# Patient Record
Sex: MALE | Race: BLACK OR AFRICAN AMERICAN | Employment: UNEMPLOYED | URBAN - METROPOLITAN AREA
[De-identification: names, ages, dates, MRNs, and addresses within clinical notes are randomized per-mention and may not be internally consistent; named-entity substitution may affect disease eponyms.]

---

## 2019-06-22 ENCOUNTER — APPOINTMENT (OUTPATIENT)
Dept: GENERAL RADIOLOGY | Age: 29
End: 2019-06-22

## 2019-06-22 ENCOUNTER — HOSPITAL ENCOUNTER (EMERGENCY)
Age: 29
Discharge: HOME | End: 2019-06-22
Attending: EMERGENCY MEDICINE

## 2019-06-22 VITALS
WEIGHT: 315 LBS | TEMPERATURE: 98.1 F | BODY MASS INDEX: 41.75 KG/M2 | DIASTOLIC BLOOD PRESSURE: 114 MMHG | SYSTOLIC BLOOD PRESSURE: 164 MMHG | OXYGEN SATURATION: 98 % | HEIGHT: 73 IN | HEART RATE: 77 BPM | RESPIRATION RATE: 20 BRPM

## 2019-06-22 DIAGNOSIS — M79.602 LEFT ARM PAIN: Primary | ICD-10-CM

## 2019-06-22 PROCEDURE — 73110 X-RAY EXAM OF WRIST: CPT

## 2019-06-22 PROCEDURE — 90715 TDAP VACCINE 7 YRS/> IM: CPT | Performed by: PHYSICIAN ASSISTANT

## 2019-06-22 PROCEDURE — 90471 IMMUNIZATION ADMIN: CPT | Performed by: PHYSICIAN ASSISTANT

## 2019-06-22 PROCEDURE — 6370000000 HC RX 637 (ALT 250 FOR IP): Performed by: PHYSICIAN ASSISTANT

## 2019-06-22 PROCEDURE — 6360000002 HC RX W HCPCS: Performed by: PHYSICIAN ASSISTANT

## 2019-06-22 PROCEDURE — 73090 X-RAY EXAM OF FOREARM: CPT

## 2019-06-22 PROCEDURE — 99283 EMERGENCY DEPT VISIT LOW MDM: CPT

## 2019-06-22 RX ORDER — OXYCODONE HYDROCHLORIDE AND ACETAMINOPHEN 5; 325 MG/1; MG/1
1 TABLET ORAL ONCE
Status: COMPLETED | OUTPATIENT
Start: 2019-06-22 | End: 2019-06-22

## 2019-06-22 RX ORDER — IBUPROFEN 800 MG/1
800 TABLET ORAL EVERY 8 HOURS PRN
Qty: 30 TABLET | Refills: 0 | Status: ON HOLD | OUTPATIENT
Start: 2019-06-22 | End: 2020-09-01 | Stop reason: HOSPADM

## 2019-06-22 RX ADMIN — OXYCODONE HYDROCHLORIDE AND ACETAMINOPHEN 1 TABLET: 5; 325 TABLET ORAL at 18:38

## 2019-06-22 RX ADMIN — TETANUS TOXOID, REDUCED DIPHTHERIA TOXOID AND ACELLULAR PERTUSSIS VACCINE, ADSORBED 0.5 ML: 5; 2.5; 8; 8; 2.5 SUSPENSION INTRAMUSCULAR at 19:06

## 2019-06-22 SDOH — HEALTH STABILITY: MENTAL HEALTH: HOW OFTEN DO YOU HAVE A DRINK CONTAINING ALCOHOL?: NEVER

## 2019-06-22 ASSESSMENT — ENCOUNTER SYMPTOMS
PHOTOPHOBIA: 0
ABDOMINAL PAIN: 0
SHORTNESS OF BREATH: 0

## 2019-06-22 ASSESSMENT — PAIN DESCRIPTION - LOCATION: LOCATION: ARM

## 2019-06-22 ASSESSMENT — PAIN DESCRIPTION - ORIENTATION: ORIENTATION: LEFT

## 2019-06-22 ASSESSMENT — PAIN SCALES - GENERAL
PAINLEVEL_OUTOF10: 6
PAINLEVEL_OUTOF10: 6

## 2019-06-22 ASSESSMENT — PAIN DESCRIPTION - PAIN TYPE: TYPE: ACUTE PAIN

## 2019-06-22 NOTE — ED TRIAGE NOTES
Pt states that he was cutting limbs off an oak tree, and was half way through cutting a limb when it broke and fell onto his left arm.

## 2019-06-22 NOTE — ED PROVIDER NOTES
[address]  [facility] ENCOUNTER          PHYSICIAN ASSISTANT NOTE       Date of evaluation: 6/22/2019    Chief Complaint     Arm Injury (cutting tree when branch fell on arm,\"large oak branch\" per pt)      History of Present Illness     Nicholas Moore is a 33 y.o. male who presents to the emergency department with left arm pain. Patient states that he was cutting a branch of a large tree approximately 30 minutes prior to arrival when the branch broke and hit his left arm. He states that the branch scraped his left arm and he was able to catch it before it landed on top. He rates his pain currently as a 7/10 in the middle of his forearm without radiation. He has not taken any medications in attempt to relieve his pain. He denies any numbness, tingling, or other change in sensation. He denies any pain to his left wrist, hand, or elbow. Review of Systems     Review of Systems   Constitutional: Negative for activity change, chills and fever. HENT: Negative for congestion. Eyes: Negative for photophobia and visual disturbance. Respiratory: Negative for shortness of breath. Cardiovascular: Negative for chest pain. Gastrointestinal: Negative for abdominal pain. Genitourinary: Negative for difficulty urinating. Musculoskeletal:        Left forearm pain   Skin:        Bruising and abrasion to left forearm   Neurological: Negative for dizziness, weakness, light-headedness, numbness and headaches. Psychiatric/Behavioral: Negative for suicidal ideas. Past Medical, Surgical, Family, and Social History     He has a past medical history of Hypertension. He has no past surgical history on file. His family history is not on file. He reports that he has never smoked. He has never used smokeless tobacco. He reports that he does not drink alcohol. Medications     Previous Medications    No medications on file       Allergies     He has No Known Allergies.     Physical Exam acute bony abnormality left forearm. LABS:   No results found for this visit on 06/22/19. RECENT VITALS:  BP: (!) 164/114, Temp: 98.1 °F (36.7 °C), Pulse: 77, Resp: 20, SpO2: 98 %     ED Course     Nursing Notes, Past Medical Hx,Past Surgical Hx, Social Hx, Allergies, and Family Hx were reviewed. The patient was given the following medications:  Orders Placed This Encounter   Medications    oxyCODONE-acetaminophen (PERCOCET) 5-325 MG per tablet 1 tablet    Tetanus-Diphth-Acell Pertussis (BOOSTRIX) injection 0.5 mL    ibuprofen (ADVIL;MOTRIN) 800 MG tablet     Sig: Take 1 tablet by mouth every 8 hours as needed for Pain     Dispense:  30 tablet     Refill:  0       CONSULTS:  None    MEDICAL DECISION MAKING / ASSESSMENT / Oc Ornelas is a 33 y.o. male who presents the emergency department with left forearm pain. Vital signs were stable on presentation and remained stable throughout his stay. Thorough history and physical exam was performed as detailed above. Patient presents to the emergency department with pain to his left forearm. He states he was cutting down a branch from a large oak tree when it broke and scraped down his left forearm. He states he was able to catch the branch with his right hand before it completely fell onto his arm. He has had significant pain ever since. He denies any pain to his left wrist, hand, elbow, or shoulder. Physical exam shows tenderness palpation to the left forearm with soft tissue swelling and multiple abrasions. Tetanus shot was updated in the emergency department. Patient continues to have full range of motion of his left wrist, however it causes pain in his forearm. He has no tenderness palpation throughout the left hand. No snuffbox tenderness. Patient is neurovascularly intact distal to his injury. He was given 1 Percocet in the emergency department for his pain. His compartments are soft.   X-ray of the left wrist and forearm were obtained and showed no acute osseous abnormality. At this time, I believe the patient is stable to be discharged. I have low suspicion for compartment syndrome given soft compartments, 2+ radial pulse, normal sensation, and warmth distal to the injury. He was given strict return precautions with concerns for possible compartment syndrome. I have low suspicion for acute fracture given normal imaging today. Patient was given a prescription for ibuprofen and told to take it along with Tylenol for his symptoms. He was told to follow-up with his primary care provider as needed. He was given strict return precautions to the emergency department and discharged home. This patient was also evaluated by the attending physician. All care plans were discussed and agreed upon. Clinical Impression     1.  Left arm pain        Disposition     PATIENT REFERRED TO:  [facility] Emergency Department  [address] [place] [address]  Go to   If symptoms worsen      DISCHARGE MEDICATIONS:  New Prescriptions    IBUPROFEN (ADVIL;MOTRIN) 800 MG TABLET    Take 1 tablet by mouth every 8 hours as needed for Pain       DISPOSITION  discharge        Estuardo Huerta PA-C  06/22/19 [id number]

## 2019-06-22 NOTE — ED NOTES
Pt discharged from ED in stable, ambulatory condition. Discharge instructions explained, all questions answered. Prescription given, kerlex and ace wrap applied prior to discharge. Pt walked to [facility] independently.        [address], RN  06/22/19 [id number]

## 2019-11-19 ENCOUNTER — HOSPITAL ENCOUNTER (EMERGENCY)
Age: 29
Discharge: HOME | End: 2019-11-19
Attending: EMERGENCY MEDICINE

## 2019-11-19 VITALS
RESPIRATION RATE: 16 BRPM | BODY MASS INDEX: 41.08 KG/M2 | DIASTOLIC BLOOD PRESSURE: 116 MMHG | SYSTOLIC BLOOD PRESSURE: 163 MMHG | WEIGHT: 310 LBS | HEIGHT: 73 IN | OXYGEN SATURATION: 99 % | TEMPERATURE: 98.8 F | HEART RATE: 83 BPM

## 2019-11-19 DIAGNOSIS — K04.7 DENTAL ABSCESS: Primary | ICD-10-CM

## 2019-11-19 PROCEDURE — 4500000022 HC ED LEVEL 2 PROCEDURE

## 2019-11-19 PROCEDURE — 6370000000 HC RX 637 (ALT 250 FOR IP): Performed by: STUDENT IN AN ORGANIZED HEALTH CARE EDUCATION/TRAINING PROGRAM

## 2019-11-19 PROCEDURE — 99282 EMERGENCY DEPT VISIT SF MDM: CPT

## 2019-11-19 PROCEDURE — 2500000003 HC RX 250 WO HCPCS: Performed by: STUDENT IN AN ORGANIZED HEALTH CARE EDUCATION/TRAINING PROGRAM

## 2019-11-19 RX ORDER — LIDOCAINE HYDROCHLORIDE AND EPINEPHRINE 10; 10 MG/ML; UG/ML
20 INJECTION, SOLUTION INFILTRATION; PERINEURAL ONCE
Status: COMPLETED | OUTPATIENT
Start: 2019-11-19 | End: 2019-11-19

## 2019-11-19 RX ORDER — PENICILLIN V POTASSIUM 500 MG/1
500 TABLET ORAL 4 TIMES DAILY
Qty: 28 TABLET | Refills: 0 | Status: SHIPPED | OUTPATIENT
Start: 2019-11-19 | End: 2019-11-26

## 2019-11-19 RX ORDER — IBUPROFEN 400 MG/1
800 TABLET ORAL ONCE
Status: COMPLETED | OUTPATIENT
Start: 2019-11-19 | End: 2019-11-19

## 2019-11-19 RX ORDER — ACETAMINOPHEN 500 MG
1000 TABLET ORAL ONCE
Status: COMPLETED | OUTPATIENT
Start: 2019-11-19 | End: 2019-11-19

## 2019-11-19 RX ADMIN — ACETAMINOPHEN 1000 MG: 500 TABLET ORAL at 11:03

## 2019-11-19 RX ADMIN — LIDOCAINE HYDROCHLORIDE,EPINEPHRINE BITARTRATE 20 ML: 10; .01 INJECTION, SOLUTION INFILTRATION; PERINEURAL at 11:03

## 2019-11-19 RX ADMIN — IBUPROFEN 800 MG: 400 TABLET, FILM COATED ORAL at 11:02

## 2019-11-19 ASSESSMENT — ENCOUNTER SYMPTOMS
SHORTNESS OF BREATH: 0
SORE THROAT: 0
COUGH: 0
EYE PAIN: 0
BACK PAIN: 0
VOMITING: 0
ABDOMINAL PAIN: 0
NAUSEA: 0

## 2019-11-19 ASSESSMENT — PAIN SCALES - GENERAL: PAINLEVEL_OUTOF10: 7

## 2019-11-19 ASSESSMENT — PAIN DESCRIPTION - PAIN TYPE: TYPE: ACUTE PAIN

## 2019-11-19 ASSESSMENT — PAIN DESCRIPTION - LOCATION: LOCATION: JAW

## 2019-11-19 ASSESSMENT — PAIN DESCRIPTION - FREQUENCY: FREQUENCY: CONTINUOUS

## 2019-11-19 ASSESSMENT — PAIN DESCRIPTION - DIRECTION: RADIATING_TOWARDS: LEFT EAR

## 2019-11-19 ASSESSMENT — PAIN DESCRIPTION - ORIENTATION: ORIENTATION: LEFT

## 2019-11-19 ASSESSMENT — PAIN DESCRIPTION - DESCRIPTORS: DESCRIPTORS: ACHING;OTHER (COMMENT)

## 2020-08-30 ENCOUNTER — APPOINTMENT (OUTPATIENT)
Dept: GENERAL RADIOLOGY | Age: 30
DRG: 305 | End: 2020-08-30

## 2020-08-30 ENCOUNTER — HOSPITAL ENCOUNTER (INPATIENT)
Age: 30
LOS: 2 days | Discharge: HOME | DRG: 305 | End: 2020-09-01
Attending: EMERGENCY MEDICINE | Admitting: INTERNAL MEDICINE
Payer: COMMERCIAL

## 2020-08-30 PROBLEM — R07.9 CHEST PAIN: Status: ACTIVE | Noted: 2020-08-30

## 2020-08-30 LAB
ANION GAP SERPL CALCULATED.3IONS-SCNC: 11 MMOL/L (ref 3–16)
BASOPHILS ABSOLUTE: 0 K/UL (ref 0–0.2)
BASOPHILS RELATIVE PERCENT: 0.4 %
BUN BLDV-MCNC: 11 MG/DL (ref 7–20)
CALCIUM SERPL-MCNC: 9.7 MG/DL (ref 8.3–10.6)
CHLORIDE BLD-SCNC: 102 MMOL/L (ref 99–110)
CHOLESTEROL, FASTING: 197 MG/DL (ref 0–199)
CO2: 28 MMOL/L (ref 21–32)
CREAT SERPL-MCNC: 1.1 MG/DL (ref 0.9–1.3)
D DIMER: <200 NG/ML DDU (ref 0–229)
EKG ATRIAL RATE: 81 BPM
EKG ATRIAL RATE: 93 BPM
EKG DIAGNOSIS: NORMAL
EKG DIAGNOSIS: NORMAL
EKG P AXIS: 63 DEGREES
EKG P AXIS: 66 DEGREES
EKG P-R INTERVAL: 168 MS
EKG P-R INTERVAL: 178 MS
EKG Q-T INTERVAL: 360 MS
EKG Q-T INTERVAL: 400 MS
EKG QRS DURATION: 94 MS
EKG QRS DURATION: 98 MS
EKG QTC CALCULATION (BAZETT): 447 MS
EKG QTC CALCULATION (BAZETT): 464 MS
EKG R AXIS: 16 DEGREES
EKG R AXIS: 19 DEGREES
EKG T AXIS: -2 DEGREES
EKG T AXIS: -8 DEGREES
EKG VENTRICULAR RATE: 81 BPM
EKG VENTRICULAR RATE: 93 BPM
EOSINOPHILS ABSOLUTE: 0.1 K/UL (ref 0–0.6)
EOSINOPHILS RELATIVE PERCENT: 1.4 %
GFR AFRICAN AMERICAN: >60
GFR NON-AFRICAN AMERICAN: >60
GLUCOSE BLD-MCNC: 101 MG/DL (ref 70–99)
HCT VFR BLD CALC: 45.5 % (ref 40.5–52.5)
HDLC SERPL-MCNC: 34 MG/DL (ref 40–60)
HEMOGLOBIN: 15.4 G/DL (ref 13.5–17.5)
LDL CHOLESTEROL CALCULATED: 138 MG/DL
LYMPHOCYTES ABSOLUTE: 1.7 K/UL (ref 1–5.1)
LYMPHOCYTES RELATIVE PERCENT: 21.8 %
MCH RBC QN AUTO: 27.9 PG (ref 26–34)
MCHC RBC AUTO-ENTMCNC: 33.9 G/DL (ref 31–36)
MCV RBC AUTO: 82.3 FL (ref 80–100)
MONOCYTES ABSOLUTE: 0.8 K/UL (ref 0–1.3)
MONOCYTES RELATIVE PERCENT: 10.5 %
NEUTROPHILS ABSOLUTE: 5.1 K/UL (ref 1.7–7.7)
NEUTROPHILS RELATIVE PERCENT: 65.9 %
PDW BLD-RTO: 14.3 % (ref 12.4–15.4)
PLATELET # BLD: 186 K/UL (ref 135–450)
PMV BLD AUTO: 9.2 FL (ref 5–10.5)
POTASSIUM REFLEX MAGNESIUM: 3.8 MMOL/L (ref 3.5–5.1)
RBC # BLD: 5.53 M/UL (ref 4.2–5.9)
SODIUM BLD-SCNC: 141 MMOL/L (ref 136–145)
TRIGLYCERIDE, FASTING: 127 MG/DL (ref 0–150)
TROPONIN: <0.01 NG/ML
VLDLC SERPL CALC-MCNC: 25 MG/DL
WBC # BLD: 7.7 K/UL (ref 4–11)

## 2020-08-30 PROCEDURE — 99285 EMERGENCY DEPT VISIT HI MDM: CPT

## 2020-08-30 PROCEDURE — 1200000000 HC SEMI PRIVATE

## 2020-08-30 PROCEDURE — 71046 X-RAY EXAM CHEST 2 VIEWS: CPT

## 2020-08-30 PROCEDURE — 80061 LIPID PANEL: CPT

## 2020-08-30 PROCEDURE — 2580000003 HC RX 258: Performed by: STUDENT IN AN ORGANIZED HEALTH CARE EDUCATION/TRAINING PROGRAM

## 2020-08-30 PROCEDURE — 36415 COLL VENOUS BLD VENIPUNCTURE: CPT

## 2020-08-30 PROCEDURE — 6370000000 HC RX 637 (ALT 250 FOR IP): Performed by: INTERNAL MEDICINE

## 2020-08-30 PROCEDURE — 84244 ASSAY OF RENIN: CPT

## 2020-08-30 PROCEDURE — 6360000002 HC RX W HCPCS: Performed by: STUDENT IN AN ORGANIZED HEALTH CARE EDUCATION/TRAINING PROGRAM

## 2020-08-30 PROCEDURE — 84484 ASSAY OF TROPONIN QUANT: CPT

## 2020-08-30 PROCEDURE — 6360000002 HC RX W HCPCS: Performed by: EMERGENCY MEDICINE

## 2020-08-30 PROCEDURE — 99223 1ST HOSP IP/OBS HIGH 75: CPT | Performed by: INTERNAL MEDICINE

## 2020-08-30 PROCEDURE — 82088 ASSAY OF ALDOSTERONE: CPT

## 2020-08-30 PROCEDURE — 80048 BASIC METABOLIC PNL TOTAL CA: CPT

## 2020-08-30 PROCEDURE — 85379 FIBRIN DEGRADATION QUANT: CPT

## 2020-08-30 PROCEDURE — 96374 THER/PROPH/DIAG INJ IV PUSH: CPT

## 2020-08-30 PROCEDURE — 85025 COMPLETE CBC W/AUTO DIFF WBC: CPT

## 2020-08-30 PROCEDURE — 6370000000 HC RX 637 (ALT 250 FOR IP): Performed by: EMERGENCY MEDICINE

## 2020-08-30 PROCEDURE — 6370000000 HC RX 637 (ALT 250 FOR IP): Performed by: STUDENT IN AN ORGANIZED HEALTH CARE EDUCATION/TRAINING PROGRAM

## 2020-08-30 PROCEDURE — 93005 ELECTROCARDIOGRAM TRACING: CPT | Performed by: EMERGENCY MEDICINE

## 2020-08-30 PROCEDURE — 99223 1ST HOSP IP/OBS HIGH 75: CPT | Performed by: SURGERY

## 2020-08-30 PROCEDURE — 83036 HEMOGLOBIN GLYCOSYLATED A1C: CPT

## 2020-08-30 RX ORDER — NIFEDIPINE 30 MG/1
60 TABLET, FILM COATED, EXTENDED RELEASE ORAL DAILY
Status: DISCONTINUED | OUTPATIENT
Start: 2020-08-30 | End: 2020-08-30

## 2020-08-30 RX ORDER — SODIUM CHLORIDE 0.9 % (FLUSH) 0.9 %
10 SYRINGE (ML) INJECTION EVERY 12 HOURS SCHEDULED
Status: DISCONTINUED | OUTPATIENT
Start: 2020-08-30 | End: 2020-09-01 | Stop reason: HOSPADM

## 2020-08-30 RX ORDER — ACETAMINOPHEN 650 MG/1
650 SUPPOSITORY RECTAL EVERY 6 HOURS PRN
Status: DISCONTINUED | OUTPATIENT
Start: 2020-08-30 | End: 2020-08-30

## 2020-08-30 RX ORDER — ACETAMINOPHEN 325 MG/1
650 TABLET ORAL EVERY 6 HOURS PRN
Status: DISCONTINUED | OUTPATIENT
Start: 2020-08-30 | End: 2020-08-30

## 2020-08-30 RX ORDER — ASPIRIN 81 MG/1
81 TABLET, CHEWABLE ORAL DAILY
Status: DISCONTINUED | OUTPATIENT
Start: 2020-08-31 | End: 2020-09-01 | Stop reason: HOSPADM

## 2020-08-30 RX ORDER — LISINOPRIL 20 MG/1
20 TABLET ORAL DAILY
Status: DISCONTINUED | OUTPATIENT
Start: 2020-08-30 | End: 2020-09-01 | Stop reason: HOSPADM

## 2020-08-30 RX ORDER — ONDANSETRON 2 MG/ML
4 INJECTION INTRAMUSCULAR; INTRAVENOUS EVERY 6 HOURS PRN
Status: DISCONTINUED | OUTPATIENT
Start: 2020-08-30 | End: 2020-09-01 | Stop reason: HOSPADM

## 2020-08-30 RX ORDER — HYDRALAZINE HYDROCHLORIDE 25 MG/1
25 TABLET, FILM COATED ORAL EVERY 8 HOURS SCHEDULED
Status: DISCONTINUED | OUTPATIENT
Start: 2020-08-30 | End: 2020-09-01

## 2020-08-30 RX ORDER — ACETAMINOPHEN 650 MG/1
650 SUPPOSITORY RECTAL EVERY 6 HOURS PRN
Status: DISCONTINUED | OUTPATIENT
Start: 2020-08-30 | End: 2020-09-01 | Stop reason: HOSPADM

## 2020-08-30 RX ORDER — POLYETHYLENE GLYCOL 3350 17 G/17G
17 POWDER, FOR SOLUTION ORAL DAILY PRN
Status: DISCONTINUED | OUTPATIENT
Start: 2020-08-30 | End: 2020-09-01 | Stop reason: HOSPADM

## 2020-08-30 RX ORDER — HYDRALAZINE HYDROCHLORIDE 20 MG/ML
10 INJECTION INTRAMUSCULAR; INTRAVENOUS EVERY 6 HOURS PRN
Status: DISCONTINUED | OUTPATIENT
Start: 2020-08-30 | End: 2020-09-01 | Stop reason: HOSPADM

## 2020-08-30 RX ORDER — IBUPROFEN 400 MG/1
800 TABLET ORAL EVERY 6 HOURS PRN
Status: DISCONTINUED | OUTPATIENT
Start: 2020-08-30 | End: 2020-08-30

## 2020-08-30 RX ORDER — ASPIRIN 325 MG
325 TABLET ORAL ONCE
Status: COMPLETED | OUTPATIENT
Start: 2020-08-30 | End: 2020-08-30

## 2020-08-30 RX ORDER — SODIUM CHLORIDE 0.9 % (FLUSH) 0.9 %
10 SYRINGE (ML) INJECTION PRN
Status: DISCONTINUED | OUTPATIENT
Start: 2020-08-30 | End: 2020-09-01 | Stop reason: HOSPADM

## 2020-08-30 RX ORDER — HYDROCODONE BITARTRATE AND ACETAMINOPHEN 5; 325 MG/1; MG/1
1 TABLET ORAL EVERY 6 HOURS PRN
Status: DISCONTINUED | OUTPATIENT
Start: 2020-08-30 | End: 2020-09-01 | Stop reason: HOSPADM

## 2020-08-30 RX ORDER — ACETAMINOPHEN 325 MG/1
650 TABLET ORAL EVERY 6 HOURS PRN
Status: DISCONTINUED | OUTPATIENT
Start: 2020-08-30 | End: 2020-09-01 | Stop reason: HOSPADM

## 2020-08-30 RX ORDER — KETOROLAC TROMETHAMINE 30 MG/ML
15 INJECTION, SOLUTION INTRAMUSCULAR; INTRAVENOUS ONCE
Status: COMPLETED | OUTPATIENT
Start: 2020-08-30 | End: 2020-08-30

## 2020-08-30 RX ORDER — IBUPROFEN 400 MG/1
800 TABLET ORAL EVERY 6 HOURS
Status: DISCONTINUED | OUTPATIENT
Start: 2020-08-30 | End: 2020-09-01 | Stop reason: HOSPADM

## 2020-08-30 RX ORDER — PROMETHAZINE HYDROCHLORIDE 12.5 MG/1
12.5 TABLET ORAL EVERY 6 HOURS PRN
Status: DISCONTINUED | OUTPATIENT
Start: 2020-08-30 | End: 2020-09-01 | Stop reason: HOSPADM

## 2020-08-30 RX ADMIN — ASPIRIN 325 MG ORAL TABLET 325 MG: 325 PILL ORAL at 08:02

## 2020-08-30 RX ADMIN — ENOXAPARIN SODIUM 40 MG: 40 INJECTION SUBCUTANEOUS at 12:44

## 2020-08-30 RX ADMIN — HYDRALAZINE HYDROCHLORIDE 25 MG: 25 TABLET, FILM COATED ORAL at 22:02

## 2020-08-30 RX ADMIN — Medication 10 ML: at 11:39

## 2020-08-30 RX ADMIN — IBUPROFEN 800 MG: 400 TABLET ORAL at 22:01

## 2020-08-30 RX ADMIN — HYDRALAZINE HYDROCHLORIDE 25 MG: 25 TABLET, FILM COATED ORAL at 14:56

## 2020-08-30 RX ADMIN — LISINOPRIL 20 MG: 20 TABLET ORAL at 12:44

## 2020-08-30 RX ADMIN — Medication 10 ML: at 22:02

## 2020-08-30 RX ADMIN — KETOROLAC TROMETHAMINE 15 MG: 30 INJECTION, SOLUTION INTRAMUSCULAR at 04:34

## 2020-08-30 RX ADMIN — IBUPROFEN 800 MG: 400 TABLET ORAL at 16:14

## 2020-08-30 RX ADMIN — HYDROCODONE BITARTRATE AND ACETAMINOPHEN 1 TABLET: 5; 325 TABLET ORAL at 17:36

## 2020-08-30 RX ADMIN — ACETAMINOPHEN 650 MG: 325 TABLET ORAL at 13:14

## 2020-08-30 ASSESSMENT — PAIN DESCRIPTION - DESCRIPTORS
DESCRIPTORS: STABBING

## 2020-08-30 ASSESSMENT — PAIN SCALES - GENERAL
PAINLEVEL_OUTOF10: 9
PAINLEVEL_OUTOF10: 0
PAINLEVEL_OUTOF10: 6
PAINLEVEL_OUTOF10: 5
PAINLEVEL_OUTOF10: 6
PAINLEVEL_OUTOF10: 4
PAINLEVEL_OUTOF10: 8
PAINLEVEL_OUTOF10: 9
PAINLEVEL_OUTOF10: 0
PAINLEVEL_OUTOF10: 7
PAINLEVEL_OUTOF10: 10

## 2020-08-30 ASSESSMENT — PAIN DESCRIPTION - DIRECTION
RADIATING_TOWARDS: MID
RADIATING_TOWARDS: CENTER

## 2020-08-30 ASSESSMENT — PAIN DESCRIPTION - ONSET
ONSET: ON-GOING

## 2020-08-30 ASSESSMENT — PAIN DESCRIPTION - PAIN TYPE
TYPE: ACUTE PAIN

## 2020-08-30 ASSESSMENT — PAIN DESCRIPTION - LOCATION
LOCATION: CHEST

## 2020-08-30 ASSESSMENT — PAIN DESCRIPTION - ORIENTATION
ORIENTATION: RIGHT

## 2020-08-30 ASSESSMENT — PAIN DESCRIPTION - PROGRESSION
CLINICAL_PROGRESSION: NOT CHANGED

## 2020-08-30 ASSESSMENT — ENCOUNTER SYMPTOMS
CHEST TIGHTNESS: 1
GASTROINTESTINAL NEGATIVE: 1
EYES NEGATIVE: 1
SHORTNESS OF BREATH: 1

## 2020-08-30 ASSESSMENT — PAIN DESCRIPTION - FREQUENCY
FREQUENCY: CONTINUOUS

## 2020-08-30 ASSESSMENT — PAIN - FUNCTIONAL ASSESSMENT
PAIN_FUNCTIONAL_ASSESSMENT: ACTIVITIES ARE NOT PREVENTED

## 2020-08-30 NOTE — CONSULTS
General Surgery   Resident Consult Note    Reason for Consult: possible chest abscess    History of Present Illness:   Baylee Birmingham is a 26 y.o. male with a history of hypertension and obesity that presented to the hospital with chest pain that started on Thursday. He said that the pain progressed to being constant. The pain started in the right pectoral region about a week ago and then on Thursday spread to the lateral side of the sternum. He states that the pain is worse with exertion and better with rest. He states that he has had a hard time breathing. While in the ED, the patient was noted to have nonspecific EKG changes and his troponin came back negative. Patient has uncontrolled blood pressure. Past Medical History:        Diagnosis Date    Hypertension        Past Surgical History:    History reviewed. No pertinent surgical history. Allergies:  Patient has no known allergies. Medications:   Home Meds  No current facility-administered medications on file prior to encounter.       Current Outpatient Medications on File Prior to Encounter   Medication Sig Dispense Refill    ibuprofen (ADVIL;MOTRIN) 800 MG tablet Take 1 tablet by mouth every 8 hours as needed for Pain 30 tablet 0       Current Meds  sodium chloride flush 0.9 % injection 10 mL, 2 times per day  sodium chloride flush 0.9 % injection 10 mL, PRN  polyethylene glycol (GLYCOLAX) packet 17 g, Daily PRN  promethazine (PHENERGAN) tablet 12.5 mg, Q6H PRN    Or  ondansetron (ZOFRAN) injection 4 mg, Q6H PRN  [START ON 8/31/2020] aspirin chewable tablet 81 mg, Daily  enoxaparin (LOVENOX) injection 40 mg, Daily  hydrALAZINE (APRESOLINE) injection 10 mg, Q6H PRN  lisinopril (PRINIVIL;ZESTRIL) tablet 20 mg, Daily  hydrALAZINE (APRESOLINE) tablet 25 mg, 3 times per day  acetaminophen (TYLENOL) tablet 650 mg, Q6H PRN    Or  acetaminophen (TYLENOL) suppository 650 mg, Q6H PRN  ibuprofen (ADVIL;MOTRIN) tablet 800 mg, Q6H  HYDROcodone-acetaminophen (NORCO) 5-325 MG per tablet 1 tablet, Q6H PRN        Family History:   History reviewed. No pertinent family history. Social History:   TOBACCO:   reports that he has never smoked. He has never used smokeless tobacco.  ETOH:   reports no history of alcohol use. DRUGS:   has no history on file for drug. Review of Systems:     A 10 point review of systems was conducted, significant findings as noted in HPI. Physical exam:    Vitals:    08/30/20 1034 08/30/20 1230 08/30/20 1446 08/30/20 1555   BP: (!) 180/120 (!) 170/113 (!) 179/114 (!) 168/93   Pulse: 71 76 80    Resp: 18 18 18    Temp: 97.2 °F (36.2 °C) 97.4 °F (36.3 °C) 97.8 °F (36.6 °C)    TempSrc: Oral Oral Oral    SpO2: 95% 97% 98%    Weight:       Height:           General appearance: alert, no acute distress, grooming appropriate, obese  Eyes: no scleral icterus  Neck: trachea midline, no JVD  Chest/Lungs: CTAB, normal effort, tenderness to palpation of the right chest in the pectoral region  Cardiovascular: RRR, no murmurs/gallops/rubs  Abdomen: soft, non-tender, non-distended, +BS, no guarding/rigidity  Skin: warm and dry, no rashes  Extremities: no edema, no cyanosis  Neuro: A&Ox3, no focal deficits, sensation intact    Labs:    CBC:   Recent Labs     08/30/20  0439   WBC 7.7   HGB 15.4   HCT 45.5   MCV 82.3        BMP:   Recent Labs     08/30/20  0439      K 3.8      CO2 28   BUN 11   CREATININE 1.1     PT/INR: No results for input(s): PROTIME, INR in the last 72 hours. APTT: No results for input(s): APTT in the last 72 hours. Liver Profile: No results found for: AST, ALT, ALB, BILIDIR, BILITOT, ALKPHOS, GGT, 5NUCNo results found for: CHOL, HDL, TRIG  UA: No results found for: NITRITE, COLORU, PHUR, LABCAST, WBCUA, RBCUA, MUCUS, TRICHOMONAS, YEAST, BACTERIA, CLARITYU, SPECGRAV, LEUKOCYTESUR, UROBILINOGEN, BILIRUBINUR, BLOODU, GLUCOSEU, AMORPHOUS    Imaging:   XR CHEST (2 VW)   Final Result     No acute cardiopulmonary process. NM MYOCARDIAL SPECT REST EXERCISE OR RX    (Results Pending)   US BREAST COMPLETE RIGHT    (Results Pending)         Assessment/Plan: This is a 26 y.o. male with history of uncontrolled hypertension and obesity with right chest pain. EKG with unspecific findings and troponin negative. Tenderness to palpation. Patient most likely has some subcutaneous inflammation. Suspicion for abscess low. - Recommend NSAID use, like Motrin. - Start antibiotics if patient develops erythema, swelling or more pain. - Possible CT exam if patient's presentation continues to worsen. - Surgery will continue to follow. Prashanth De La Cruz MD  08/30/20  5:26 PM     I saw and independently examined the patient today. I agree with the history of present illness, past medical/surgical histories, family history, social history, medication list and allergies as listed. The review of systems is as noted above. My physical exam confirms the findings listed above. Review of labs, pathology reports, radiology reports and medical records confirm the findings noted above. I edited the note where appropriate.     Inflammation of right chest wall area of unclear etiology  No obvious clinically drainable abscess  May need CT or US if symptoms continue  Will follow    Dania Ohara MD  Surgery Attending

## 2020-08-30 NOTE — ED TRIAGE NOTES
Patient presents to ED with complaints of right side chest pain that has been going on for 3 days. He states the pain is constant and stabbing in nature. +SOB. Patient has not taken anything for the pain at home. Patient placed on cardiac monitor.

## 2020-08-30 NOTE — CONSULTS
[address]   Cardiology Consultation   Date: 8/30/2020  Admit Date:  8/30/2020  Reason for Consultation: Chest pain  Consult Requesting Physician: Tahir Marin MD     Chief Complaint   Patient presents with    Chest Pain    Shortness of Breath     HPI: Edilia Barlow is a 26 y.o. morbidly obese gentleman, with a past medical history significant for hypertension, was admitted to the hospital secondary to right-sided chest pain which is going on for the past 1 week to start 10 days. At baseline, he is reasonably active. He used to work for [employer]. Recently, he tore his knee and then underwent knee surgery and since then, his activities are limited. 1 week ago, he started noticing right-sided pain which was starting in the region of the nipple and then radiating towards the medial and lateral side. For the past few days, the pain was getting worse and yesterday, it was more intense to the point of 7 x 10 intensity. This pain was worse more with the movements and deep breaths. Hence, he and his wife was concerned and hence came to the ER. Secondary to chest pain, cardiology was consulted for further evaluation and management. Past Medical History:   Diagnosis Date    Hypertension         History reviewed. No pertinent surgical history. No Known Allergies    Social History:  Reviewed. reports that he has never smoked. He has never used smokeless tobacco. He reports that he does not drink alcohol. Family History:  Reviewed. family history is not on file. No premature CAD. Review of System:  All other systems reviewed except for that noted above.  Pertinent negatives and positives are:     · General: negative for fever, chills   · Ophthalmic ROS: negative for - eye pain or loss of vision  · ENT ROS: negative for - headaches, sore throat   · Respiratory: negative for - cough, sputum  · Cardiovascular: Reviewed in HPI  · Gastrointestinal: negative for - abdominal pain, diarrhea, N/V  · Hematology: negative for - bleeding, blood clots, bruising or jaundice  · Genito-Urinary:  negative for - Dysuria or incontinence  · Musculoskeletal: negative for - Joint swelling, muscle pain  · Neurological: negative for - confusion, dizziness, headaches   · Psychiatric: No anxiety, no depression. · Dermatological: negative for - rash    Physical Examination:  Vitals:    20 1034   BP: (!) 180/120   Pulse: 71   Resp: 18   Temp: 97.2 °F (36.2 °C)   SpO2: 95%      No intake or output data in the 24 hours ending 20 1222  No intake/output data recorded. Wt Readings from Last 3 Encounters:   20 (!) 348 lb (157.9 kg)   19 (!) 310 lb (140.6 kg)   19 (!) 315 lb (142.9 kg)     Temp  Av.7 °F (36.5 °C)  Min: 97.2 °F (36.2 °C)  Max: 98.1 °F (36.7 °C)  Pulse  Av.2  Min: 56  Max: 96  BP  Min: 158/115  Max: 184/116  SpO2  Av.8 %  Min: 92 %  Max: 100 %    · Telemetry: Sinus rhythm   · Constitutional: Alert. Oriented to person, place, and time. No distress. · Head: Normocephalic and atraumatic. · Mouth/Throat: Lips appear moist. Oropharynx is clear and moist.  · Eyes: Conjunctivae normal. EOM are normal.   · Neck: Neck supple. No lymphadenopathy. No rigidity. No JVD present. · Cardiovascular: Normal rate, regular rhythm. Normal S1&S2. Carotid pulse 2+ bilaterally. · Pulmonary/Chest: Bilateral respiratory sounds present. No respiratory accessory muscle use. No wheezes, No rhonchi. On palpation of the chest wall, there is significant tenderness along the right mammary region. Less tender on the right parasternal region. · Abdominal: Soft. Normal bowel sounds present. No distension, No tenderness. No splenomegaly. No hernia. · Musculoskeletal: No tenderness. No edema    · Lymphadenopathy: Has no cervical adenopathy. · Neurological: Alert and oriented. Cranial nerve II-XII grossly intact, No gross deficit to touch. · Skin: Skin is warm and dry.  No rash, lesions, ulcerations noted. · Psychiatric: No anxiety nor agitation. Labs:  Reviewed. Recent Labs     08/30/20 0439      K 3.8      CO2 28   BUN 11   CREATININE 1.1     Recent Labs     08/30/20 0439   WBC 7.7   HGB 15.4   HCT 45.5   MCV 82.3        Lab Results   Component Value Date    TROPONINI <0.01 08/30/2020     No results found for: BNP  No results found for: PROTIME, INR  No results found for: CHOL, HDL, TRIG    Diagnostic and imaging results reviewed. ECG: Normal sinus rhythm, nonspecific ST-T changes    I independently reviewed the ECG and telemetry. Scheduled Meds:   sodium chloride flush  10 mL Intravenous 2 times per day    [START ON 8/31/2020] aspirin  81 mg Oral Daily    enoxaparin  40 mg Subcutaneous Daily    NIFEdipine  60 mg Oral Daily     Continuous Infusions:  PRN Meds:.sodium chloride flush, acetaminophen **OR** acetaminophen, polyethylene glycol, promethazine **OR** ondansetron, hydrALAZINE     Assessment:   Patient Active Problem List    Diagnosis Date Noted    Chest pain 08/30/2020      Active Hospital Problems    Diagnosis Date Noted    Chest pain [R07.9] 08/30/2020     Recommendation(s):  1. Chest pain  2. Poorly controlled hypertension  3. Morbid obesity, counselled about weight loss    He does have risk factors in the form of obesity, family history of coronary artery disease in his father, poorly controlled hypertension. However, clinically this is noncardiac chest pain. His EKG shows nonspecific changes. Troponin x2 negative. Secondary to the risk factors, recommended Lexiscan for risk stratification. As he had a recent knee surgery, will proceed further chemical stress test.    On physical examination, I am concerned about his right mammary region discomfort/tenderness. I do not see any external abnormality. However, on holding the mammary tissue on the right side, he is in significant pain.   Hence, I recommend the primary team to evaluate for noncardiac cause of chest pain including any inflammation/infection of the subcuticular /submammary region on the right side. For the poorly controlled hypertension, he was initiated on nifedipine long-acting by the primary team.  I prefer to have him on a short acting/immediate release form in the initial phase so that we can have a good understanding about the required dose. Hence, I stopped nifedipine long-acting and started him on lisinopril 20 mg daily and hydralazine 25 mg p.o. every 8 hours. Recommend using NSAID for his musculoskeletal pain. - The patient is counseled to follow a low salt diet to assure blood pressure remains controlled for cardiovascular risk factor modification.   - The patient is counseled to avoid excess caffeine, and energy drinks as this may exacerbated ectopy and arrhythmia. - The patient is counseled to get regular exercise 3-5 times per week to control cardiovascular risk factors. - The patient is counseled to lose weigt to control cardiovascular risk factors. -The patient is counseled about the health hazards of smoking including cardiovascular side effects and its impact on morbidity and mortality. Patient will be seen by Dr. Terrie Daniels from tomorrow    Thank you for allowing me to participate in the care of Karl Wilkins . If you have any questions/comments, please do not hesitate to contact us. Isma Bella MD   Cardiac Electrophysiology  [address] [place] [address]  Office [phone]    For any EP related issues after 5 PM, contact Payton Esparza on call cardiology through .

## 2020-08-30 NOTE — H&P
Internal Medicine  PGY 1  History & Physical      CC chest pain    History Obtained From:  patient    HISTORY OF PRESENT ILLNESS:  Patient is a 76-year-old male the past medical history of hypertension who presents with the chief complaint of chest pain. Patient reports the pain started on Thursday has been constant since. He reports that the pain is located on the right side of his sternum and extends laterally. There is no radiation to the neck or complaint of shoulder pain. He currently rates the pain a 7 out of 10. It is made better with rest and is made worse with movement, exertion and deep breaths. Patient does not report taking anything for the pain. He also reports right-sided pectoral pain which started approximately 1 week before. He denies any trauma to the area. He denies any recent fevers or chills. He he does report a small headache that started yesterday. He denies any change in vision, dizziness or ringing in the ears. He denies any abdominal pain, change in urinary habits or change in bowel habits. Patient does have a history of hypertension. He was taken off his blood pressure medications at the age of 23 and has not been on them since. No significant family history of any cardiac disease. On evaluation in the emergency department, patient did have some EKG abnormalities in leads II and III. There were no previous EKGs to compare this to. His pain was relieved with Toradol and aspirin in the emergency department. He did not receive nitro. Past Medical History:        Diagnosis Date    Hypertension        Past Surgical History:    History reviewed. No pertinent surgical history. Medications Priorto Admission:    Not in a hospital admission. Allergies:  Patient has no known allergies. Social History:   · TOBACCO:   reports that he has never smoked. He has never used smokeless tobacco.  · ETOH:   reports no history of alcohol use.   · DRUGS : NA  · Patient currently 45.5          BMP:   Recent Labs     08/30/20  0439      K 3.8      CO2 28   BUN 11   CREATININE 1.1   GLUCOSE 101*     LFT's: No results for input(s): AST, ALT, ALB, BILITOT, ALKPHOS in the last 72 hours. Troponin:   Recent Labs     08/30/20  0439 08/30/20  0638   TROPONINI <0.01 <0.01     BNP:No results for input(s): BNP in the last 72 hours. ABGs: No results for input(s): PHART, OIU6TBW, PO2ART in the last 72 hours. INR: No results for input(s): INR in the last 72 hours. U/A:No results for input(s): NITRITE, COLORU, PHUR, LABCAST, WBCUA, RBCUA, MUCUS, TRICHOMONAS, YEAST, BACTERIA, CLARITYU, SPECGRAV, LEUKOCYTESUR, UROBILINOGEN, BILIRUBINUR, BLOODU, GLUCOSEU, AMORPHOUS in the last 72 hours. Invalid input(s): KETONESU    XR CHEST (2 VW)   Final Result     No acute cardiopulmonary process. ASSESSMENT AND PLAN:  Chest Pain  - EKG abnormalities were noted in the emergency department, patient did not have prior EKGs for comparison  - Medical history is significant for hypertension, however the patient does not take any medication for this  - Likely a musculoskeletal origin as pain was reproducible on palpation  - We will likely need a nuclear/chemical stress test as patient has a history of knee surgery and may be unable to walk enough for stress test  - A1c and fasting lipid panel ordered    Hypertension  - Does not take any medications at home  - We will continue to monitor throughout admission  - lisinopril 20 mg daily and hydralazine 25 mg p.o. every 8 hours.   - Hydralazine PRN  - Working up for [employer]       Will discuss with attending physician     Code Status:Full code  FEN: General  PPX: Lovenox   DISPO: Dianna Cardoza MD  8/30/2020,  8:55 AM

## 2020-08-30 NOTE — PROGRESS NOTES
4 Eyes Admission Assessment     I agree as the admission nurse that 2 RN's have performed a thorough Head to Toe Skin Assessment on the patient. ALL assessment sites listed below have been assessed on admission.        Areas assessed by both nurses:   [x]   Head, Face, and Ears   [x]   Shoulders, Back, and Chest  [x]   Arms, Elbows, and Hands   [x]   Coccyx, Sacrum, and Ischium  [x]   Legs, Feet, and Heels        Does the Patient have Skin Breakdown?  no        Rod Prevention initiated:  NA  Wound Care Orders initiated:  NA      WOC nurse consulted for Pressure Injury (Stage 3,4, Unstageable, DTI, NWPT, and Complex wounds) or Rod score 18 or lower:  NA     Nurse 1 eSignature: Electronically signed by Danielle Arce RN on 8/30/20 at 10:56 AM EDT    **SHARE this note so that the co-signing nurse is able to place an eSignature**    Nurse 2 eSignature: Electronically signed by Bri Parikh RN on 8/30/20 at 11:04 AM EDT

## 2020-08-30 NOTE — ED PROVIDER NOTES
[address] [facility]          ATTENDING PHYSICIAN NOTE       Date of evaluation: 8/30/2020    Chief Complaint     Chest Pain and Shortness of Breath      History of Present Illness     Paula Musa is a 26 y.o. male who presents with complaint of chest pain. He reports had a fill out the last 3 days or so. It bothers him he says constantly, but is worse with exertion. Localizes it to the right of the sternum. Described as a sharp feeling. He has associated shortness of breath with it. Reports he is not had chest pain similar to this in the past.  No recent fevers or chills. No nausea or vomiting. No cough. Review of Systems     Review of Systems  Pertinent positives negatives are listed in the HPI; otherwise all systems are reviewed and were negative  Past Medical, Surgical, Family, and Social History     He has a past medical history of Hypertension. He has no past surgical history on file. His family history is not on file. He reports that he has never smoked. He has never used smokeless tobacco. He reports that he does not drink alcohol. Medications     Previous Medications    IBUPROFEN (ADVIL;MOTRIN) 800 MG TABLET    Take 1 tablet by mouth every 8 hours as needed for Pain       Allergies     He has No Known Allergies. Physical Exam     INITIAL VITALS: BP: (!) 169/122, Temp: 98.1 °F (36.7 °C), Pulse: 96, Resp: 13, SpO2: 100 %   Physical Exam  Constitutional:       Appearance: He is obese. HENT:      Head: Normocephalic and atraumatic. Cardiovascular:      Rate and Rhythm: Normal rate and regular rhythm. Pulmonary:      Effort: Pulmonary effort is normal.      Breath sounds: No decreased breath sounds, wheezing, rhonchi or rales. Chest:      Chest wall: No tenderness. Abdominal:      Palpations: Abdomen is soft. Tenderness: There is no abdominal tenderness. Musculoskeletal: Normal range of motion. Right lower leg: No edema.       Left lower leg: No edema. Skin:     General: Skin is warm and dry. Neurological:      General: No focal deficit present. Mental Status: He is alert and oriented to person, place, and time. Diagnostic Results     EKG   Sinus rhythm. Rate 81 bpm, VA interval 178, QRS 98 ms. QTc 464. There is some slight ST elevation noted in lead I and aVL, well under 1 mm. There is T wave inversion some ST depression in lead III. No previous EKGs are available for comparison. RADIOLOGY:  XR CHEST (2 VW)   Final Result     No acute cardiopulmonary process.               LABS:   Results for orders placed or performed during the hospital encounter of 08/30/20   CBC auto differential   Result Value Ref Range    WBC 7.7 4.0 - 11.0 K/uL    RBC 5.53 4.20 - 5.90 M/uL    Hemoglobin 15.4 13.5 - 17.5 g/dL    Hematocrit 45.5 40.5 - 52.5 %    MCV 82.3 80.0 - 100.0 fL    MCH 27.9 26.0 - 34.0 pg    MCHC 33.9 31.0 - 36.0 g/dL    RDW 14.3 12.4 - 15.4 %    Platelets 391 329 - 215 K/uL    MPV 9.2 5.0 - 10.5 fL    Neutrophils % 65.9 %    Lymphocytes % 21.8 %    Monocytes % 10.5 %    Eosinophils % 1.4 %    Basophils % 0.4 %    Neutrophils Absolute 5.1 1.7 - 7.7 K/uL    Lymphocytes Absolute 1.7 1.0 - 5.1 K/uL    Monocytes Absolute 0.8 0.0 - 1.3 K/uL    Eosinophils Absolute 0.1 0.0 - 0.6 K/uL    Basophils Absolute 0.0 0.0 - 0.2 K/uL   Troponin (lab)   Result Value Ref Range    Troponin <0.01 <0.01 ng/mL   Basic Metabolic Panel w/ Reflex to MG   Result Value Ref Range    Sodium 141 136 - 145 mmol/L    Potassium reflex Magnesium 3.8 3.5 - 5.1 mmol/L    Chloride 102 99 - 110 mmol/L    CO2 28 21 - 32 mmol/L    Anion Gap 11 3 - 16    Glucose 101 (H) 70 - 99 mg/dL    BUN 11 7 - 20 mg/dL    CREATININE 1.1 0.9 - 1.3 mg/dL    GFR Non-African American >60 >60    GFR African American >60 >60    Calcium 9.7 8.3 - 10.6 mg/dL   D-Dimer, Quantitative   Result Value Ref Range    D-Dimer, Quant <200 0 - 229 ng/mL DDU   Troponin   Result Value Ref Range Troponin <0.01 <0.01 ng/mL   EKG 12 Lead   Result Value Ref Range    Ventricular Rate 93 BPM    Atrial Rate 93 BPM    P-R Interval 168 ms    QRS Duration 94 ms    Q-T Interval 360 ms    QTc Calculation (Bazett) 447 ms    P Axis 63 degrees    R Axis 16 degrees    T Axis -2 degrees    Diagnosis       EKG performed in ER and to be interpreted by ER physician. Confirmed by MD, ER ([id number]),  GARLAND MCCANN ([phone]) on 8/30/2020 7:52:16 AM   EKG 12 Lead   Result Value Ref Range    Ventricular Rate 81 BPM    Atrial Rate 81 BPM    P-R Interval 178 ms    QRS Duration 98 ms    Q-T Interval 400 ms    QTc Calculation (Bazett) 464 ms    P Axis 66 degrees    R Axis 19 degrees    T Axis -8 degrees    Diagnosis       EKG performed in ER and to be interpreted by ER physician. Confirmed by MD, ER ([id number]),  Duong Pulido, [address] [place] [address] ([phone]) on 8/30/2020 7:52:29 AM       ED BEDSIDE ULTRASOUND:      RECENT VITALS:  BP: (!) 158/115,Temp: 98.1 °F (36.7 °C), Pulse: 81, Resp: 16, SpO2: 97 %     Procedures         ED Course     Nursing Notes, Past Medical Hx, Past Surgical Hx, Social Hx,Allergies, and Family Hx were reviewed. patient was given the following medications:  Orders Placed This Encounter   Medications    ketorolac (TORADOL) injection 15 mg    aspirin tablet 325 mg       CONSULTS:  IP CONSULT TO HOSPITALIST    MEDICAL DECISIONMAKING / ASSESSMENT / Giselle Hilary is a 26 y.o. male Who presents with exertional right-sided chest pain for the last 3 days associated with a feeling of shortness of breath. He still having the pain here, and was given some Toradol for pain which helped somewhat but not completely ameliorated. Troponin negative x2, his labs are otherwise unremarkable. His EKG has some mild elevation in his lateral leads, under 1 mm, and without dynamic changes, but also has some mild ST depression in lead III.   While one would expect chest pain due to ischemia this been going on for 3 days would likely produce at this point even a modest elevation in troponin, he does have these EKG abnormalities, we have no previous to compare to. His heart score is a 4, moderate. Given that he has this yearly EKG for the setting of a troponin, with his moderate risk heart score I am reticent to send the patient home without provocative testing otherwise. We're unable to get a stress test today. We will subsequently plan for admission for evaluation and further risk stratification. No infectious symptoms noted, no reports of cough or fever to suggest COVID infection. D-dimer negative, argue strongly against PE as the culprit cause. .      Clinical Impression     1. Chest pain, unspecified type        Disposition     PATIENT REFERRED TO:  No follow-up provider specified.     DISCHARGE MEDICATIONS:  New Prescriptions    No medications on file       DISPOSITION Decision To Admit 08/30/2020 07:58:30 AM         Clay Wakefield MD  08/30/20 0940

## 2020-08-30 NOTE — ED NOTES
Patient states he used to take medication for his BP, but was taken off medication by MD. Lisette Claudio RN  08/30/20 9764

## 2020-08-30 NOTE — PROGRESS NOTES
Pt reporting chest pain 9/10. He is sinus on tele. Tylenol did not help. MD romano served to inform.

## 2020-08-31 ENCOUNTER — APPOINTMENT (OUTPATIENT)
Dept: ULTRASOUND IMAGING | Age: 30
DRG: 305 | End: 2020-08-31

## 2020-08-31 ENCOUNTER — APPOINTMENT (OUTPATIENT)
Dept: CT IMAGING | Age: 30
DRG: 305 | End: 2020-08-31

## 2020-08-31 LAB
ANION GAP SERPL CALCULATED.3IONS-SCNC: 8 MMOL/L (ref 3–16)
BUN BLDV-MCNC: 14 MG/DL (ref 7–20)
CALCIUM SERPL-MCNC: 9.2 MG/DL (ref 8.3–10.6)
CHLORIDE BLD-SCNC: 104 MMOL/L (ref 99–110)
CO2: 28 MMOL/L (ref 21–32)
CREAT SERPL-MCNC: 1.2 MG/DL (ref 0.9–1.3)
EKG ATRIAL RATE: 63 BPM
EKG ATRIAL RATE: 66 BPM
EKG DIAGNOSIS: NORMAL
EKG DIAGNOSIS: NORMAL
EKG P AXIS: 52 DEGREES
EKG P AXIS: 69 DEGREES
EKG P-R INTERVAL: 174 MS
EKG P-R INTERVAL: 178 MS
EKG Q-T INTERVAL: 426 MS
EKG Q-T INTERVAL: 442 MS
EKG QRS DURATION: 94 MS
EKG QRS DURATION: 96 MS
EKG QTC CALCULATION (BAZETT): 446 MS
EKG QTC CALCULATION (BAZETT): 452 MS
EKG R AXIS: 33 DEGREES
EKG R AXIS: 41 DEGREES
EKG T AXIS: 0 DEGREES
EKG T AXIS: 6 DEGREES
EKG VENTRICULAR RATE: 63 BPM
EKG VENTRICULAR RATE: 66 BPM
ESTIMATED AVERAGE GLUCOSE: 125.5 MG/DL
GFR AFRICAN AMERICAN: >60
GFR NON-AFRICAN AMERICAN: >60
GLUCOSE BLD-MCNC: 96 MG/DL (ref 70–99)
HBA1C MFR BLD: 6 %
HCT VFR BLD CALC: 44.6 % (ref 40.5–52.5)
HEMOGLOBIN: 14.9 G/DL (ref 13.5–17.5)
MAGNESIUM: 2.3 MG/DL (ref 1.8–2.4)
MCH RBC QN AUTO: 27.3 PG (ref 26–34)
MCHC RBC AUTO-ENTMCNC: 33.3 G/DL (ref 31–36)
MCV RBC AUTO: 81.9 FL (ref 80–100)
PDW BLD-RTO: 14.5 % (ref 12.4–15.4)
PHOSPHORUS: 4 MG/DL (ref 2.5–4.9)
PLATELET # BLD: 166 K/UL (ref 135–450)
PMV BLD AUTO: 9.1 FL (ref 5–10.5)
POTASSIUM REFLEX MAGNESIUM: 3.9 MMOL/L (ref 3.5–5.1)
RBC # BLD: 5.45 M/UL (ref 4.2–5.9)
SODIUM BLD-SCNC: 140 MMOL/L (ref 136–145)
WBC # BLD: 6.4 K/UL (ref 4–11)

## 2020-08-31 PROCEDURE — 36415 COLL VENOUS BLD VENIPUNCTURE: CPT

## 2020-08-31 PROCEDURE — 2580000003 HC RX 258: Performed by: STUDENT IN AN ORGANIZED HEALTH CARE EDUCATION/TRAINING PROGRAM

## 2020-08-31 PROCEDURE — 6370000000 HC RX 637 (ALT 250 FOR IP): Performed by: STUDENT IN AN ORGANIZED HEALTH CARE EDUCATION/TRAINING PROGRAM

## 2020-08-31 PROCEDURE — 75574 CT ANGIO HRT W/3D IMAGE: CPT

## 2020-08-31 PROCEDURE — 93005 ELECTROCARDIOGRAM TRACING: CPT | Performed by: STUDENT IN AN ORGANIZED HEALTH CARE EDUCATION/TRAINING PROGRAM

## 2020-08-31 PROCEDURE — 76642 ULTRASOUND BREAST LIMITED: CPT

## 2020-08-31 PROCEDURE — 93010 ELECTROCARDIOGRAM REPORT: CPT | Performed by: INTERNAL MEDICINE

## 2020-08-31 PROCEDURE — 6360000004 HC RX CONTRAST MEDICATION: Performed by: INTERNAL MEDICINE

## 2020-08-31 PROCEDURE — 94760 N-INVAS EAR/PLS OXIMETRY 1: CPT

## 2020-08-31 PROCEDURE — 84100 ASSAY OF PHOSPHORUS: CPT

## 2020-08-31 PROCEDURE — 85027 COMPLETE CBC AUTOMATED: CPT

## 2020-08-31 PROCEDURE — 1200000000 HC SEMI PRIVATE

## 2020-08-31 PROCEDURE — 6370000000 HC RX 637 (ALT 250 FOR IP): Performed by: INTERNAL MEDICINE

## 2020-08-31 PROCEDURE — 83735 ASSAY OF MAGNESIUM: CPT

## 2020-08-31 PROCEDURE — 80048 BASIC METABOLIC PNL TOTAL CA: CPT

## 2020-08-31 PROCEDURE — 99232 SBSQ HOSP IP/OBS MODERATE 35: CPT | Performed by: SURGERY

## 2020-08-31 PROCEDURE — 6360000002 HC RX W HCPCS: Performed by: STUDENT IN AN ORGANIZED HEALTH CARE EDUCATION/TRAINING PROGRAM

## 2020-08-31 PROCEDURE — 93005 ELECTROCARDIOGRAM TRACING: CPT

## 2020-08-31 RX ORDER — ASPIRIN 81 MG/1
81 TABLET, CHEWABLE ORAL DAILY
Qty: 30 TABLET | Refills: 3 | Status: CANCELLED | OUTPATIENT
Start: 2020-09-01

## 2020-08-31 RX ADMIN — Medication 10 ML: at 09:58

## 2020-08-31 RX ADMIN — Medication 10 ML: at 20:00

## 2020-08-31 RX ADMIN — ASPIRIN 81 MG: 81 TABLET, CHEWABLE ORAL at 09:58

## 2020-08-31 RX ADMIN — HYDRALAZINE HYDROCHLORIDE 25 MG: 25 TABLET, FILM COATED ORAL at 21:35

## 2020-08-31 RX ADMIN — IOPAMIDOL 80 ML: 755 INJECTION, SOLUTION INTRAVENOUS at 15:24

## 2020-08-31 RX ADMIN — LISINOPRIL 20 MG: 20 TABLET ORAL at 09:58

## 2020-08-31 RX ADMIN — IBUPROFEN 800 MG: 400 TABLET ORAL at 21:36

## 2020-08-31 RX ADMIN — IBUPROFEN 800 MG: 400 TABLET ORAL at 09:58

## 2020-08-31 RX ADMIN — IBUPROFEN 800 MG: 400 TABLET ORAL at 04:35

## 2020-08-31 RX ADMIN — IBUPROFEN 800 MG: 400 TABLET ORAL at 15:46

## 2020-08-31 RX ADMIN — ENOXAPARIN SODIUM 40 MG: 40 INJECTION SUBCUTANEOUS at 09:58

## 2020-08-31 RX ADMIN — HYDRALAZINE HYDROCHLORIDE 25 MG: 25 TABLET, FILM COATED ORAL at 05:01

## 2020-08-31 RX ADMIN — HYDRALAZINE HYDROCHLORIDE 25 MG: 25 TABLET, FILM COATED ORAL at 15:46

## 2020-08-31 ASSESSMENT — PAIN SCALES - GENERAL
PAINLEVEL_OUTOF10: 0
PAINLEVEL_OUTOF10: 0
PAINLEVEL_OUTOF10: 3
PAINLEVEL_OUTOF10: 0

## 2020-08-31 ASSESSMENT — PAIN - FUNCTIONAL ASSESSMENT: PAIN_FUNCTIONAL_ASSESSMENT: ACTIVITIES ARE NOT PREVENTED

## 2020-08-31 ASSESSMENT — PAIN DESCRIPTION - PAIN TYPE: TYPE: ACUTE PAIN

## 2020-08-31 ASSESSMENT — ENCOUNTER SYMPTOMS
COLOR CHANGE: 0
VOMITING: 0
DIARRHEA: 0
CONSTIPATION: 0
SORE THROAT: 0
SHORTNESS OF BREATH: 0
ABDOMINAL PAIN: 0
CHEST TIGHTNESS: 0
COUGH: 0
NAUSEA: 0

## 2020-08-31 ASSESSMENT — PAIN DESCRIPTION - FREQUENCY: FREQUENCY: INTERMITTENT

## 2020-08-31 ASSESSMENT — PAIN DESCRIPTION - PROGRESSION: CLINICAL_PROGRESSION: GRADUALLY IMPROVING

## 2020-08-31 ASSESSMENT — PAIN DESCRIPTION - DESCRIPTORS: DESCRIPTORS: HEADACHE

## 2020-08-31 ASSESSMENT — PAIN DESCRIPTION - LOCATION: LOCATION: HEAD

## 2020-08-31 ASSESSMENT — PAIN DESCRIPTION - ONSET: ONSET: SUDDEN

## 2020-08-31 NOTE — PROGRESS NOTES
Patient arrived alert and orientated x 4, ambulatory, denies pain. Initial HR 74  SR, ,97 % on RA. CTA Coronary study done without complication. Patient given IV Metoprolol 10 mg in three doses over 10 minutes and 1 SL Nitro. See flow sheets for VS.   Patient was taken to inpatient room.

## 2020-08-31 NOTE — PROGRESS NOTES
General Surgery   Daily Progress Note  Patient: Joana Tomlinson      CC: Possible chest abscess     SUBJECTIVE:  Patient rested well overnight. Pain in left chest is improved. Patient had an episode of chest pain overnight that is now resolved, afebrile. ROS:   A 14 point review of systems was conducted, significant findings as noted above. All other systems negative. OBJECTIVE:    PHYSICAL EXAM:    Vitals:    08/30/20 2115 08/31/20 0017 08/31/20 0401 08/31/20 0430   BP: (!) 154/102 137/81 (!) 168/102 (!) 156/97   Pulse: 89 67 67    Resp: 16 16 16    Temp: 99 °F (37.2 °C) 97 °F (36.1 °C) 98.2 °F (36.8 °C)    TempSrc: Oral Oral Oral    SpO2: 98% 94% 97%    Weight:       Height:           General appearance: alert, no acute distress, grooming appropriate, obese  Eyes: no scleral icterus  Neck: trachea midline, no JVD  Chest/Lungs: CTAB, normal effort, minimal tenderness to palpation of the right chest in the pectoral region, no erythema or fluctuence noted   Cardiovascular: RRR, no murmurs/gallops/rubs  Abdomen: soft, non-tender, non-distended, no guarding/rigidity  Skin: warm and dry, no rashes  Extremities: no edema, no cyanosis  Neuro: A&Ox3, no focal deficits, sensation intact    LABS:   Recent Labs     08/30/20  0439 08/31/20  0628   WBC 7.7 6.4   HGB 15.4 14.9   HCT 45.5 44.6   MCV 82.3 81.9    166        Recent Labs     08/30/20  0439      K 3.8      CO2 28   BUN 11   CREATININE 1.1      No results for input(s): AST, ALT, ALB, BILIDIR, BILITOT, ALKPHOS in the last 72 hours. No results for input(s): LIPASE, AMYLASE in the last 72 hours. No results for input(s): PROT, INR, APTT in the last 72 hours. Recent Labs     08/30/20  1131 08/30/20  1524   TROPONINI <0.01 <0.01         ASSESSMENT & PLAN:   This is a 26 y.o. male with history of uncontrolled hypertension and obesity with right chest pain. EKG with unspecific findings and troponin negative. Tenderness to palpation.  Patient most likely has some subcutaneous inflammation. Suspicion for abscess is low. - Will follow up on labs this morning  - No obvious clinically drainable abscess, inflammation of right chest wall of unclear etiology  - Continued NSAID for pain control  - May need CT or US if symptoms continue  - Surgery team will continue to monitor    Kelli Maldonado DO, MS  PGY1, General Surgery  08/31/20  7:07 AM  [phone]    I have personally performed the medical history, physical exam and medical decision making and agree with all pertinent clinical information unless otherwise noted.      Joslyn Miles MD  Surgery Attending

## 2020-08-31 NOTE — PLAN OF CARE
Problem: Pain:  Goal: Control of acute pain  Description: Control of acute pain  Note: He denies any chest pain. He has a slight headache, 3 out of 10. He is on scheduled Ibuprofen. Will monitor. Problem: Cardiac:  Goal: Hemodynamic stability will improve  Description: Hemodynamic stability will improve  Intervention: Monitor blood pressure  Note: Blood pressure has been elevated. He has not needed any prn medication. Will monitor.

## 2020-08-31 NOTE — PROGRESS NOTES
Progress Note    Admit Date: 8/30/2020  Day: 2  Diet: Diet NPO, After Midnight    CC: Chest pain    Interval history: Patient presented to ED with exertional right-sided chest pain for the past 3 days. Was given toradol in the ED which mildly relieved pain. Troponin negative x2. EKG showed mild elevation in lateral leads and some mild ST depression in lead III. History of hypertension, was taken off of his medications at the age of 23. Cardio recs Lisinopril 20 mg daily and hydralazine 25 mg p.o. every 8 hours. Surgery recs NSAID for chest pain control. HPI: Patient denies acute overnight events. Patient reports right-sided chest pain that is constant and aching in nature and rates it as 5/10 currently. Patient reports that his pain has slightly improved from yesterday. Patient denies fever, chills, nausea, vomiting, and shortness of breath. Medications:     Scheduled Meds:   sodium chloride flush  10 mL Intravenous 2 times per day    aspirin  81 mg Oral Daily    enoxaparin  40 mg Subcutaneous Daily    lisinopril  20 mg Oral Daily    hydrALAZINE  25 mg Oral 3 times per day    ibuprofen  800 mg Oral Q6H     Continuous Infusions:  PRN Meds:sodium chloride flush, polyethylene glycol, promethazine **OR** ondansetron, hydrALAZINE, acetaminophen **OR** acetaminophen, HYDROcodone 5 mg - acetaminophen    Objective:   Vitals:   T-max:  Patient Vitals for the past 8 hrs:   BP Temp Temp src Pulse Resp SpO2   08/31/20 1246 -- -- -- -- 18 97 %   08/31/20 0925 (!) 163/105 -- -- 77 16 97 %   08/31/20 0825 (!) 147/99 96.9 °F (36.1 °C) Oral 68 16 97 %       Intake/Output Summary (Last 24 hours) at 8/31/2020 1311  Last data filed at 8/31/2020 0655  Gross per 24 hour   Intake 480 ml   Output --   Net 480 ml       Review of Systems   Constitutional: Negative for chills and fever. HENT: Negative for congestion, sneezing and sore throat. Respiratory: Negative for cough, chest tightness and shortness of breath. Cardiovascular: Positive for chest pain. Gastrointestinal: Negative for abdominal pain, constipation, diarrhea, nausea and vomiting. Endocrine: Negative for polydipsia, polyphagia and polyuria. Genitourinary: Negative for difficulty urinating, dysuria and urgency. Musculoskeletal: Negative for neck pain and neck stiffness. Skin: Negative for color change and rash. Neurological: Negative for dizziness, light-headedness and headaches. Psychiatric/Behavioral: Negative for confusion. The patient is not nervous/anxious. Physical Exam  Constitutional:       Appearance: He is obese. He is not ill-appearing. HENT:      Head: Normocephalic and atraumatic. Nose: Nose normal. No congestion. Mouth/Throat:      Mouth: Mucous membranes are moist.   Eyes:      Extraocular Movements: Extraocular movements intact. Pupils: Pupils are equal, round, and reactive to light. Neck:      Musculoskeletal: No neck rigidity or muscular tenderness. Cardiovascular:      Rate and Rhythm: Normal rate and regular rhythm. Pulses: Normal pulses. Heart sounds: Normal heart sounds. Pulmonary:      Effort: Pulmonary effort is normal.      Breath sounds: Normal breath sounds. Chest:      Chest wall: Tenderness present. Comments: Tenderness to palpation of right chest, localized to underneath nipple. No erythema, edema, fluctuance, or crepitus noted. Abdominal:      General: Bowel sounds are normal.   Musculoskeletal:         General: No swelling. Right lower leg: No edema. Left lower leg: No edema. Skin:     General: Skin is warm. Capillary Refill: Capillary refill takes less than 2 seconds. Findings: No erythema. Neurological:      General: No focal deficit present. Mental Status: He is alert and oriented to person, place, and time.    Psychiatric:         Mood and Affect: Mood normal.         Behavior: Behavior normal.         LABS:    CBC:   Recent Labs 08/30/20 0439 08/31/20  0628   WBC 7.7 6.4   HGB 15.4 14.9   HCT 45.5 44.6    166   MCV 82.3 81.9     Renal:    Recent Labs     08/30/20 0439 08/31/20  0628    140   K 3.8 3.9    104   CO2 28 28   BUN 11 14   CREATININE 1.1 1.2   GLUCOSE 101* 96   CALCIUM 9.7 9.2   MG  --  2.30   PHOS  --  4.0   ANIONGAP 11 8     Hepatic: No results for input(s): AST, ALT, BILITOT, BILIDIR, PROT, LABALBU, ALKPHOS in the last 72 hours. Troponin:   Recent Labs     08/30/20  0638 08/30/20  1131 08/30/20  1524   TROPONINI <0.01 <0.01 <0.01     BNP: No results for input(s): BNP in the last 72 hours. Lipids:   Recent Labs     08/30/20 0439   HDL 34*     ABGs:  No results for input(s): PHART, TQX9DSX, PO2ART, QDV9KVN, BEART, THGBART, G3UCSZIH, OKT1FDM in the last 72 hours. INR: No results for input(s): INR in the last 72 hours. Lactate: No results for input(s): LACTATE in the last 72 hours. Cultures:  -----------------------------------------------------------------  RAD:   US BREAST LIMITED RIGHT   Final Result      There is no radiographic evidence for malignancy or infection. Follow-up should be based on clinical assessment. ASSESSMENT: BI-RADS 2 Benign Finding   RECOMMENDATION: Male patient, follow-up based on clinical assessment               XR CHEST (2 VW)   Final Result     No acute cardiopulmonary process. CTA CARDIAC SCREENING    (Results Pending)       Assessment/Plan:   Chest Pain  -EKG abnormalities were noted in the emergency department, patient did not have prior EKGs for comparison  -Troponin negative x 3  -Likely a musculoskeletal origin as pain was reproducible on palpation underneath right nipple; Ultrasound rules out abscess/infection  -Cardiology consulted. CTA Cardiac Screening today.   -A1c 6.0       Hypertension  -Does not take any medications at home  -Lisinopril 20 mg daily and hydralazine 25 mg p.o. every 8 hours.  -Hydralazine PRN  -Working up for [employer], will follow-up aldosterone  -We will continue to monitor throughout admission    Code Status: Full code  FEN: General  PPX: Lovenox   DISPO: [address] DPM  Podiatric Resident, PGY-1      08/31/20  1:11 PM    This patient has been staffed and discussed with Khushboo Suarez MD.     Patient seen and examined, plan of care discussed with residents. Agree with their assessment and plan with following addendum:  Continue with NSAIDs for chest wall/right breast pain, no abnormalities are seen on ultrasound. Stress test was changed over to cardiac CTA- awaiting results before discharge. BP is improving with medications.        Khushboo Suarez

## 2020-08-31 NOTE — DISCHARGE SUMMARY
INTERNAL MEDICINE DEPARTMENT AT [address] [place] [address]  Discharge Summary At the [facility] [employer]    Patient ID: Abdirahman Torres                                             Discharge Date: 9/1/2020   Patient's PCP: No primary care provider on file. Discharge Physician: Dow Barthel, DPM PGY-1  Admit Date: 8/30/2020   Admitting Physician: Josef Lima MD    PROBLEMS DURING HOSPITALIZATION: None    DISCHARGE DIAGNOSES: Gynecomastia, right; Hypertensive urgency; chest pain    Hospital Course: Abdirahman Torres presented to the ED on 8/30/2020 with complaint of right-sided chest pain for 3 days that worsened with exertion. Pain was reproducible with palpation of the right chest below the nipple. He was given Toradol in the ED which mildly reduced his chest pain. His troponin was negative x2 in the ED. EKG showed mild elevation in lateral leads and mild ST depression in lead III; no previous EKG was in the chart for comparison. Patient was admitted for chest pain, ACS rule out. An ultrasound was performed on the right chest which ruled out abscess and infection and showed Gynecomastia on 8/31/20. Cardiology was consulted and performed a CTA cardiac screening 08/31/20 which showed no significant coronary artery stenosis. The patient's pain resided over the course of his stay, is now only localized to the right nipple/areolar area, and controlled with Ibuprofen. It was determined at this time that the patient was stable for discharge to home. Patient is to continue Lisinopril 20mg daily & Norvasc 5mg daily for Hypertension and may take Ibuprofen as needed for right nipple pain. Patient is to follow up in outpatient resident clinic within 1 week for establishment, blood pressure check, and further evaluation of gynecomastia with serum hormone levels.     Physical Exam:  BP (!) 152/94   Pulse 79   Temp 97.4 °F (36.3 °C) (Oral)   Resp 18   Ht 6' 1\" (1.854 m)   Wt (!) 337 lb 14.4 oz (153.3 kg)   SpO2 96%   BMI 44.58 kg/m²     Physical Exam     Significant Diagnostic Studies:   Disposition: home  Discharged Condition: Stable  Follow Up: Primary Care Physician in one week    DISCHARGE MEDICATION:     Medication List      START taking these medications    amLODIPine 5 MG tablet  Commonly known as:  NORVASC  Take 1 tablet by mouth daily  Start taking on:  September 2, 2020     lisinopril 20 MG tablet  Commonly known as:  PRINIVIL;ZESTRIL  Take 1 tablet by mouth daily        STOP taking these medications    ibuprofen 800 MG tablet  Commonly known as:  ADVIL;MOTRIN           Where to Get Your Medications      You can get these medications from any pharmacy    Bring a paper prescription for each of these medications  · amLODIPine 5 MG tablet  · lisinopril 20 MG tablet       Activity: activity as tolerated  Diet: regular diet  Wound Care: none needed    Time Spent on discharge is more than 30 minutes    Signed:  Jeromy Acevedo DPM, PGY-1  9/1/2020       Patient seen and examined, plan of care discussed with residents. Agree with their assessment and plan with following addendum:  Patient is stable for discharge, time spent more than 30 minutes.        Beverly Moseley

## 2020-08-31 NOTE — CARE COORDINATION
Case Management Assessment           Initial Evaluation                Date / Time of Evaluation: 8/31/2020 12:37 PM                 Assessment Completed by: Soco Herrera    Patient Name: Bridgett John     YOB: 1990  Diagnosis: Chest pain [R07.9]  Chest pain [R07.9]     Date / Time: 8/30/2020  3:54 AM    Patient Admission Status: Inpatient    If patient is discharged prior to next notation, then this note serves as note for discharge by case management. Current PCP: No primary care provider on file. Clinic Patient: Yes    Chart Reviewed: Yes  Patient/ Family Interviewed: Yes    Initial assessment completed at bedside with: yes  Upon  D/C     Hospitalization in the last 30 days: No    Emergency Contacts:  Extended Emergency Contact Information  Primary Emergency Contact: Troy Meraz  Home Phone: [phone]  Relation: Spouse    Advance Directives:   Code Status: Full Code    Healthcare Power of : No  Agent: NA  Contact Number: AN    Copy present: No     In paper Chart: No    Scanned into EMR No    Financial  Payor: /     Pre-cert required for SNF: No    Pharmacy  No Pharmacies Listed    Potential assistance Purchasing Medications: Potential Assistance Purchasing Medications: No  Does Patient want to participate in local refill/ meds to beds program?: No    Meds To Beds General Rules:  1. Can ONLY be done Monday- Friday between 8:30am-5pm  2. Prescription(s) must be in pharmacy by 3pm to be filled same day  3. Copy of patient's insurance/ prescription drug card and patient face sheet must be sent along with the prescription(s)  4. Cost of Rx cannot be added to hospital bill. If financial assistance is needed, please contact unit  or ;  or  CANNOT provide pharmacy voucher for patients co-pays  5.  Patients can then  the prescription on their way out of the hospital at discharge, or pharmacy can deliver to the bedside if staff is available. (payment due at time of pick-up or delivery - cash, check, or card accepted)     Able to afford home medications/ co-pay costs: Yes    ADLS  Support Systems: Children, Parent, Family Members, Spouse/Significant Other    PT AM-PAC:     OT AM-PAC:       [place]: Home w/ family  Steps: NA    Plans to RETURN to current housing: Yes  Barriers to RETURNING to current housin [address]  Currently ACTIVE with  [address]: No  Home Care Agency: Not Applicable    Currently ACTIVE with [place] on Aging: No  Passport/ Waiver: No  Passport/ Waiver Services: Not Applicable      Durable Medical Equipment  DME Provider: NA  Equipment: NA    Home Oxygen and [address] [place] prior to admission: [address]: Not Applicable  Other Respiratory Equipment: NA    Dialysis  Active with HD/PD prior to admission: No  Nephrologist: PETER    HD Center:  Not Applicable    DISCHARGE PLAN:  Disposition: Home- No Services Needed    Transportation PLAN for discharge: family     Factors facilitating achievement of predicted outcomes: Cooperative and Pleasant    Barriers to discharge: Pain    Additional Case Management Notes:     CM was not able to complete  Assessment yest  : pt now has  D/C order  Home  :    Disposition: home    Follow Up: Primary Care Physician in one week will go to out pt  [address]  Confirmed to return home and follow up out pt . Patient from home with family indep pta:  Denies any needs at  D/C  At this time ,  ; cardiology and  General surgery consulted  .      Will benefit  From Meds to [facility]  If  Needed at  D/C     these medications from any pharmacy with your printed prescription    amLODIPine    lisinopril      Schedule an appointment with [facility] as soon as possible for a visit in 1 week(s)    [address] [place] [address]   [phone]    The Plan for Transition of Care is related to the following treatment goals of Chest pain [R07.9]  Chest pain [R07.9]    The Patient and/or patient representative Luisa Morelos and his family were provided with a choice of provider and agrees with the discharge plan Yes    Freedom of choice list was provided with basic dialogue that supports the patient's individualized plan of care/goals and shares the quality data associated with the providers.  Yes    Care Transition patient: No    Nahomy Hardin RN  [facility]  Case Management Department  Ph: [phone]

## 2020-08-31 NOTE — PLAN OF CARE
Problem: Pain:  Description: Pain management should include both nonpharmacologic and pharmacologic interventions. Goal: Pain level will decrease  Outcome: Ongoing  Note: Patient denied pain during shift. Scheduled IBuprofen given. Patient in bed with call light in reach. Will continue to monitor

## 2020-09-01 VITALS
DIASTOLIC BLOOD PRESSURE: 94 MMHG | OXYGEN SATURATION: 96 % | HEART RATE: 79 BPM | BODY MASS INDEX: 41.75 KG/M2 | SYSTOLIC BLOOD PRESSURE: 152 MMHG | WEIGHT: 315 LBS | HEIGHT: 73 IN | RESPIRATION RATE: 18 BRPM | TEMPERATURE: 97.4 F

## 2020-09-01 LAB
ALDOSTERONE: 9.5 NG/DL
ANION GAP SERPL CALCULATED.3IONS-SCNC: 12 MMOL/L (ref 3–16)
BUN BLDV-MCNC: 15 MG/DL (ref 7–20)
C-REACTIVE PROTEIN: 4.3 MG/L (ref 0–5.1)
CALCIUM SERPL-MCNC: 9 MG/DL (ref 8.3–10.6)
CHLORIDE BLD-SCNC: 103 MMOL/L (ref 99–110)
CO2: 24 MMOL/L (ref 21–32)
CREAT SERPL-MCNC: 1 MG/DL (ref 0.9–1.3)
ESTRADIOL LEVEL: 23 PG/ML
FOLLICLE STIMULATING HORMONE: 0.7 MIU/ML
GFR AFRICAN AMERICAN: >60
GFR NON-AFRICAN AMERICAN: >60
GLUCOSE BLD-MCNC: 108 MG/DL (ref 70–99)
LUTEINIZING HORMONE: 4.4 MIU/ML
POTASSIUM REFLEX MAGNESIUM: 4.2 MMOL/L (ref 3.5–5.1)
SEDIMENTATION RATE, ERYTHROCYTE: 5 MM/HR (ref 0–15)
SODIUM BLD-SCNC: 139 MMOL/L (ref 136–145)
TOTAL CK: 214 U/L (ref 39–308)

## 2020-09-01 PROCEDURE — 85652 RBC SED RATE AUTOMATED: CPT

## 2020-09-01 PROCEDURE — 6370000000 HC RX 637 (ALT 250 FOR IP): Performed by: INTERNAL MEDICINE

## 2020-09-01 PROCEDURE — 82670 ASSAY OF TOTAL ESTRADIOL: CPT

## 2020-09-01 PROCEDURE — 80048 BASIC METABOLIC PNL TOTAL CA: CPT

## 2020-09-01 PROCEDURE — 84704 HCG FREE BETACHAIN TEST: CPT

## 2020-09-01 PROCEDURE — 6370000000 HC RX 637 (ALT 250 FOR IP): Performed by: STUDENT IN AN ORGANIZED HEALTH CARE EDUCATION/TRAINING PROGRAM

## 2020-09-01 PROCEDURE — 83001 ASSAY OF GONADOTROPIN (FSH): CPT

## 2020-09-01 PROCEDURE — 99232 SBSQ HOSP IP/OBS MODERATE 35: CPT | Performed by: SURGERY

## 2020-09-01 PROCEDURE — 36415 COLL VENOUS BLD VENIPUNCTURE: CPT

## 2020-09-01 PROCEDURE — 84403 ASSAY OF TOTAL TESTOSTERONE: CPT

## 2020-09-01 PROCEDURE — 82550 ASSAY OF CK (CPK): CPT

## 2020-09-01 PROCEDURE — 2580000003 HC RX 258: Performed by: STUDENT IN AN ORGANIZED HEALTH CARE EDUCATION/TRAINING PROGRAM

## 2020-09-01 PROCEDURE — 84270 ASSAY OF SEX HORMONE GLOBUL: CPT

## 2020-09-01 PROCEDURE — 6360000002 HC RX W HCPCS: Performed by: STUDENT IN AN ORGANIZED HEALTH CARE EDUCATION/TRAINING PROGRAM

## 2020-09-01 PROCEDURE — 86140 C-REACTIVE PROTEIN: CPT

## 2020-09-01 PROCEDURE — 83002 ASSAY OF GONADOTROPIN (LH): CPT

## 2020-09-01 RX ORDER — LISINOPRIL 20 MG/1
20 TABLET ORAL DAILY
Qty: 30 TABLET | Refills: 3 | Status: SHIPPED | OUTPATIENT
Start: 2020-09-01

## 2020-09-01 RX ORDER — AMLODIPINE BESYLATE 5 MG/1
5 TABLET ORAL DAILY
Qty: 30 TABLET | Refills: 3 | Status: SHIPPED | OUTPATIENT
Start: 2020-09-02

## 2020-09-01 RX ORDER — AMLODIPINE BESYLATE 5 MG/1
5 TABLET ORAL DAILY
Status: DISCONTINUED | OUTPATIENT
Start: 2020-09-01 | End: 2020-09-01 | Stop reason: HOSPADM

## 2020-09-01 RX ADMIN — ASPIRIN 81 MG: 81 TABLET, CHEWABLE ORAL at 08:22

## 2020-09-01 RX ADMIN — IBUPROFEN 800 MG: 400 TABLET ORAL at 03:59

## 2020-09-01 RX ADMIN — AMLODIPINE BESYLATE 5 MG: 5 TABLET ORAL at 08:26

## 2020-09-01 RX ADMIN — LISINOPRIL 20 MG: 20 TABLET ORAL at 08:22

## 2020-09-01 RX ADMIN — Medication 10 ML: at 08:22

## 2020-09-01 RX ADMIN — ENOXAPARIN SODIUM 40 MG: 40 INJECTION SUBCUTANEOUS at 08:22

## 2020-09-01 RX ADMIN — HYDRALAZINE HYDROCHLORIDE 25 MG: 25 TABLET, FILM COATED ORAL at 05:53

## 2020-09-01 ASSESSMENT — ENCOUNTER SYMPTOMS
ABDOMINAL PAIN: 0
SHORTNESS OF BREATH: 0
COUGH: 0
VOMITING: 0
CHEST TIGHTNESS: 0
NAUSEA: 0
COLOR CHANGE: 0
SORE THROAT: 0
CONSTIPATION: 0
DIARRHEA: 0

## 2020-09-01 ASSESSMENT — PAIN SCALES - GENERAL
PAINLEVEL_OUTOF10: 0

## 2020-09-01 NOTE — PROGRESS NOTES
Surgery Daily Progress Note  Patient: Carson Guerra    Subjective :  No acute events overnight  Still with Right nipple pain/sensitivity  Chest pain resolved     Objective    Infusions:     I/O:I/O last 3 completed shifts: In: 350 [P.O.:350]  Out: -            Wt Readings from Last 1 Encounters:   09/01/20 (!) 337 lb 14.4 oz (153.3 kg)                 LABS:    Recent Labs     08/30/20  0439 08/31/20  0628   WBC 7.7 6.4   HGB 15.4 14.9   HCT 45.5 44.6   MCV 82.3 81.9    166        Recent Labs     08/31/20  0628 09/01/20  0615    139   K 3.9 4.2    103   CO2 28 24   PHOS 4.0  --    BUN 14 15   CREATININE 1.2 1.0      No results for input(s): AST, ALT, ALB, BILIDIR, BILITOT, ALKPHOS in the last 72 hours. No results for input(s): LIPASE, AMYLASE in the last 72 hours. No results for input(s): PROT, INR, APTT in the last 72 hours. Recent Labs     08/30/20  1131 08/30/20  1524 09/01/20  0615   CKTOTAL  --   --  214   TROPONINI <0.01 <0.01  --          Exam:BP (!) 167/109   Pulse 77   Temp 97 °F (36.1 °C) (Oral)   Resp 18   Ht 6' 1\" (1.854 m)   Wt (!) 337 lb 14.4 oz (153.3 kg)   SpO2 99%   BMI 44.58 kg/m²   General appearance: alert, appears stated age and cooperative  Lungs: Non-labored respirations  Heart: regular rate  Abdomen: soft, non-tender   Right breast: gynecomastia noted (symmetric to left breast), no masses palpated, nipple/areolar complex is ttp, no fluctuance, no erythema, no axillary lymphadenopathy palpated        ASSESSMENT/PLAN: Pt. is a 26 y.o. male with bilateral gynecomastia and Right nipple/areolar complex tenderness      - Physical exam is negative for masses or lymphadenopathy.   Breasts are symmetrical.    - US: mild retro-areolar gynecomastia, BI-RADS 2  - He denies any personal or family history of cancers (including breast)  - He denies taking any steroids (including non-prescription), denies use of any illicit drugs      - At this point, breast cancer and

## 2020-09-01 NOTE — PLAN OF CARE
Problem: Pain:  Goal: Pain level will decrease  Description: Pain level will decrease  Outcome: Met This Shift  Note: Pt denies pain tonight. Pt states he thinks he pain has been better since \"you guys got my BP under control\". Pt instructed to notify this RN of pain or discomfort. Pt verbalized understanding of all instructions. Will continue to monitor comfort.      Problem: Cardiac:  Goal: Hemodynamic stability will improve  Description: Hemodynamic stability will improve  Outcome: Ongoing

## 2020-09-01 NOTE — PROGRESS NOTES
Patient discharged home. IV removed, site unremarkable. Discharge instructions reviewed with patient and he verbalizes understanding. He walked to [facility] entrance where his ride is waiting.

## 2020-09-01 NOTE — PROGRESS NOTES
Progress Note    Admit Date: 8/30/2020  Day: 2  Diet: DIET CARDIAC; No Caffeine    CC: Chest pain    Interval history: Patient presented to ED with exertional right-sided chest pain for the past 3 days on 08/30/20. Was given toradol in the ED which mildly relieved pain. Troponin negative x3. EKG showed mild elevation in lateral leads and some mild ST depression in lead III. History of hypertension, was taken off of his medications at the age of 23. Cardio recs Lisinopril 20 mg daily and hydralazine 25 mg p.o. every 8 hours. Surgery recs NSAID for right nipple/areolar pain and further gynecomastia workup by PCP outpatient. HPI: Patient denies acute overnight events. Patient reports pain right nipple area that is constant and aching in nature and rates it as 3/10 currently. Patient reports that his pain has slightly improved from yesterday. Patient denies fever, chills, nausea, vomiting, and shortness of breath. Medications:     Scheduled Meds:   amLODIPine  5 mg Oral Daily    sodium chloride flush  10 mL Intravenous 2 times per day    aspirin  81 mg Oral Daily    enoxaparin  40 mg Subcutaneous Daily    lisinopril  20 mg Oral Daily    ibuprofen  800 mg Oral Q6H     Continuous Infusions:  PRN Meds:sodium chloride flush, polyethylene glycol, promethazine **OR** ondansetron, hydrALAZINE, acetaminophen **OR** acetaminophen, HYDROcodone 5 mg - acetaminophen    Objective:   Vitals:   T-max:  Patient Vitals for the past 8 hrs:   BP Temp Temp src Pulse Resp SpO2 Weight   09/01/20 0817 (!) 167/109 97 °F (36.1 °C) Oral 77 18 99 % --   09/01/20 0600 -- -- -- -- -- -- (!) 337 lb 14.4 oz (153.3 kg)   09/01/20 0552 (!) 144/103 98.4 °F (36.9 °C) Oral 63 18 96 % --       Intake/Output Summary (Last 24 hours) at 9/1/2020 0926  Last data filed at 9/1/2020 0601  Gross per 24 hour   Intake 320 ml   Output --   Net 320 ml       Review of Systems   Constitutional: Negative for chills and fever.    HENT: Negative for congestion, sneezing and sore throat. Respiratory: Negative for cough, chest tightness and shortness of breath. Cardiovascular: Positive for chest pain. Gastrointestinal: Negative for abdominal pain, constipation, diarrhea, nausea and vomiting. Endocrine: Negative for polydipsia, polyphagia and polyuria. Genitourinary: Negative for difficulty urinating, dysuria and urgency. Musculoskeletal: Negative for neck pain and neck stiffness. Skin: Negative for color change and rash. Neurological: Negative for dizziness, light-headedness and headaches. Psychiatric/Behavioral: Negative for confusion. The patient is not nervous/anxious. Physical Exam  Constitutional:       Appearance: He is obese. He is not ill-appearing. HENT:      Head: Normocephalic and atraumatic. Nose: Nose normal. No congestion. Mouth/Throat:      Mouth: Mucous membranes are moist.   Eyes:      Extraocular Movements: Extraocular movements intact. Pupils: Pupils are equal, round, and reactive to light. Neck:      Musculoskeletal: No neck rigidity or muscular tenderness. Cardiovascular:      Rate and Rhythm: Normal rate and regular rhythm. Pulses: Normal pulses. Heart sounds: Normal heart sounds. Pulmonary:      Effort: Pulmonary effort is normal.      Breath sounds: Normal breath sounds. Chest:      Chest wall: Tenderness present. Comments: Tenderness to palpation of right chest, localized to underneath nipple. No erythema, edema, fluctuance, or crepitus noted. Abdominal:      General: Bowel sounds are normal.   Musculoskeletal:         General: No swelling. Right lower leg: No edema. Left lower leg: No edema. Skin:     General: Skin is warm. Capillary Refill: Capillary refill takes less than 2 seconds. Findings: No erythema. Neurological:      General: No focal deficit present. Mental Status: He is alert and oriented to person, place, and time.

## 2020-09-02 LAB — HCG TUMOR MARKER: <1 IU/L (ref 0–3)

## 2020-09-03 LAB
SEX HORMONE BINDING GLOBULIN: 23 NMOL/L (ref 11–80)
TESTOSTERONE FREE-NONMALE: 41.6 PG/ML (ref 47–244)
TESTOSTERONE TOTAL: 181 NG/DL (ref 220–1000)

## 2020-09-08 LAB — RENIN ACTIVITY: 0.5 NG/ML/HR

## 2020-09-28 PROBLEM — R07.9 CHEST PAIN: Status: RESOLVED | Noted: 2020-08-30 | Resolved: 2020-09-28

## 2023-09-26 ENCOUNTER — APPOINTMENT (OUTPATIENT)
Dept: GENERAL RADIOLOGY | Age: 33
End: 2023-09-26

## 2023-09-26 ENCOUNTER — HOSPITAL ENCOUNTER (EMERGENCY)
Age: 33
Discharge: HOME | End: 2023-09-26
Attending: STUDENT IN AN ORGANIZED HEALTH CARE EDUCATION/TRAINING PROGRAM

## 2023-09-26 VITALS
SYSTOLIC BLOOD PRESSURE: 170 MMHG | WEIGHT: 315 LBS | TEMPERATURE: 98.1 F | OXYGEN SATURATION: 99 % | HEIGHT: 72 IN | HEART RATE: 77 BPM | RESPIRATION RATE: 14 BRPM | DIASTOLIC BLOOD PRESSURE: 122 MMHG | BODY MASS INDEX: 42.66 KG/M2

## 2023-09-26 DIAGNOSIS — R07.89 CHEST WALL PAIN: ICD-10-CM

## 2023-09-26 DIAGNOSIS — R06.02 SHORTNESS OF BREATH: ICD-10-CM

## 2023-09-26 DIAGNOSIS — I10 HYPERTENSION, UNSPECIFIED TYPE: Primary | ICD-10-CM

## 2023-09-26 LAB
ANION GAP SERPL CALCULATED.3IONS-SCNC: 11 MMOL/L (ref 3–16)
BASOPHILS # BLD: 0 K/UL (ref 0–0.2)
BASOPHILS NFR BLD: 0.4 %
BUN SERPL-MCNC: 12 MG/DL (ref 7–20)
CALCIUM SERPL-MCNC: 9.3 MG/DL (ref 8.3–10.6)
CHLORIDE SERPL-SCNC: 103 MMOL/L (ref 99–110)
CO2 SERPL-SCNC: 26 MMOL/L (ref 21–32)
CREAT SERPL-MCNC: 1 MG/DL (ref 0.9–1.3)
D DIMER: <0.27 UG/ML FEU (ref 0–0.6)
DEPRECATED RDW RBC AUTO: 14.4 % (ref 12.4–15.4)
EKG ATRIAL RATE: 81 BPM
EKG ATRIAL RATE: 87 BPM
EKG DIAGNOSIS: NORMAL
EKG DIAGNOSIS: NORMAL
EKG P AXIS: 68 DEGREES
EKG P AXIS: 70 DEGREES
EKG P-R INTERVAL: 170 MS
EKG P-R INTERVAL: 176 MS
EKG Q-T INTERVAL: 396 MS
EKG Q-T INTERVAL: 412 MS
EKG QRS DURATION: 100 MS
EKG QRS DURATION: 94 MS
EKG QTC CALCULATION (BAZETT): 476 MS
EKG QTC CALCULATION (BAZETT): 478 MS
EKG R AXIS: 11 DEGREES
EKG R AXIS: 11 DEGREES
EKG T AXIS: 0 DEGREES
EKG T AXIS: 12 DEGREES
EKG VENTRICULAR RATE: 81 BPM
EKG VENTRICULAR RATE: 87 BPM
EOSINOPHIL # BLD: 0.1 K/UL (ref 0–0.6)
EOSINOPHIL NFR BLD: 1 %
GFR SERPLBLD CREATININE-BSD FMLA CKD-EPI: >60 ML/MIN/{1.73_M2}
GLUCOSE SERPL-MCNC: 103 MG/DL (ref 70–99)
HCT VFR BLD AUTO: 44.8 % (ref 40.5–52.5)
HGB BLD-MCNC: 15.2 G/DL (ref 13.5–17.5)
LYMPHOCYTES # BLD: 1.5 K/UL (ref 1–5.1)
LYMPHOCYTES NFR BLD: 24.7 %
MCH RBC QN AUTO: 27.7 PG (ref 26–34)
MCHC RBC AUTO-ENTMCNC: 33.9 G/DL (ref 31–36)
MCV RBC AUTO: 81.8 FL (ref 80–100)
MONOCYTES # BLD: 0.6 K/UL (ref 0–1.3)
MONOCYTES NFR BLD: 9.4 %
NEUTROPHILS # BLD: 3.8 K/UL (ref 1.7–7.7)
NEUTROPHILS NFR BLD: 64.5 %
NT-PROBNP SERPL-MCNC: 140 PG/ML (ref 0–124)
PLATELET # BLD AUTO: 185 K/UL (ref 135–450)
PMV BLD AUTO: 8.8 FL (ref 5–10.5)
POTASSIUM SERPL-SCNC: 3.7 MMOL/L (ref 3.5–5.1)
RBC # BLD AUTO: 5.48 M/UL (ref 4.2–5.9)
SARS-COV-2 RDRP RESP QL NAA+PROBE: NOT DETECTED
SODIUM SERPL-SCNC: 140 MMOL/L (ref 136–145)
TROPONIN, HIGH SENSITIVITY: 7 NG/L (ref 0–22)
TROPONIN, HIGH SENSITIVITY: 7 NG/L (ref 0–22)
WBC # BLD AUTO: 5.9 K/UL (ref 4–11)

## 2023-09-26 PROCEDURE — 6370000000 HC RX 637 (ALT 250 FOR IP): Performed by: STUDENT IN AN ORGANIZED HEALTH CARE EDUCATION/TRAINING PROGRAM

## 2023-09-26 PROCEDURE — 84484 ASSAY OF TROPONIN QUANT: CPT

## 2023-09-26 PROCEDURE — 36415 COLL VENOUS BLD VENIPUNCTURE: CPT

## 2023-09-26 PROCEDURE — 85025 COMPLETE CBC W/AUTO DIFF WBC: CPT

## 2023-09-26 PROCEDURE — 80048 BASIC METABOLIC PNL TOTAL CA: CPT

## 2023-09-26 PROCEDURE — 85379 FIBRIN DEGRADATION QUANT: CPT

## 2023-09-26 PROCEDURE — 71046 X-RAY EXAM CHEST 2 VIEWS: CPT

## 2023-09-26 PROCEDURE — 83880 ASSAY OF NATRIURETIC PEPTIDE: CPT

## 2023-09-26 PROCEDURE — 93005 ELECTROCARDIOGRAM TRACING: CPT | Performed by: STUDENT IN AN ORGANIZED HEALTH CARE EDUCATION/TRAINING PROGRAM

## 2023-09-26 PROCEDURE — 87635 SARS-COV-2 COVID-19 AMP PRB: CPT

## 2023-09-26 PROCEDURE — 99285 EMERGENCY DEPT VISIT HI MDM: CPT

## 2023-09-26 RX ORDER — AMLODIPINE BESYLATE 5 MG/1
5 TABLET ORAL DAILY
Qty: 90 TABLET | Refills: 0 | Status: SHIPPED | OUTPATIENT
Start: 2023-09-26

## 2023-09-26 RX ORDER — ACETAMINOPHEN 500 MG
1000 TABLET ORAL
Status: COMPLETED | OUTPATIENT
Start: 2023-09-26 | End: 2023-09-26

## 2023-09-26 RX ADMIN — ACETAMINOPHEN 1000 MG: 500 TABLET ORAL at 11:22

## 2023-09-26 ASSESSMENT — ENCOUNTER SYMPTOMS
CHEST TIGHTNESS: 1
VOMITING: 0
COUGH: 0
SORE THROAT: 0
SHORTNESS OF BREATH: 1
ABDOMINAL PAIN: 0
NAUSEA: 0

## 2023-09-26 ASSESSMENT — PATIENT HEALTH QUESTIONNAIRE - PHQ9
2. FEELING DOWN, DEPRESSED OR HOPELESS: 0
SUM OF ALL RESPONSES TO PHQ QUESTIONS 1-9: 0
SUM OF ALL RESPONSES TO PHQ9 QUESTIONS 1 & 2: 0
1. LITTLE INTEREST OR PLEASURE IN DOING THINGS: 0

## 2023-09-26 ASSESSMENT — PAIN SCALES - GENERAL
PAINLEVEL_OUTOF10: 6
PAINLEVEL_OUTOF10: 6

## 2023-09-26 ASSESSMENT — PAIN DESCRIPTION - DESCRIPTORS: DESCRIPTORS: ACHING

## 2023-09-26 ASSESSMENT — LIFESTYLE VARIABLES
HOW MANY STANDARD DRINKS CONTAINING ALCOHOL DO YOU HAVE ON A TYPICAL DAY: PATIENT DOES NOT DRINK
HOW OFTEN DO YOU HAVE A DRINK CONTAINING ALCOHOL: NEVER

## 2023-09-26 ASSESSMENT — PAIN - FUNCTIONAL ASSESSMENT
PAIN_FUNCTIONAL_ASSESSMENT: ACTIVITIES ARE NOT PREVENTED
PAIN_FUNCTIONAL_ASSESSMENT: NONE - DENIES PAIN
PAIN_FUNCTIONAL_ASSESSMENT: 0-10

## 2023-09-26 ASSESSMENT — PAIN DESCRIPTION - LOCATION
LOCATION: CHEST
LOCATION: CHEST

## 2023-09-26 ASSESSMENT — PAIN DESCRIPTION - ORIENTATION: ORIENTATION: MID

## 2023-09-26 NOTE — ED TRIAGE NOTES
Patient arrives c/o chest pain, shortness of breath, cough and headache that started when he was cheering for his kids at a football game on Saturday. Patient states that he lost his breath completely when it initially happened and he has had trouble ever since.

## 2023-09-26 NOTE — DISCHARGE INSTRUCTIONS
Your blood pressure was elevated today. We have sent a prescription amlodipine to your pharmacy. Please start taking this and follow-up with your primary care doctor in the next 1 to 2 weeks to see how your blood pressure is doing. In regards to your shortness of breath and chest pain, your testing today was reassuring but you should follow-up with your doctor to discuss further steps, such as a stress test.  Turn to the emergency room if you develop any significant worsening of your chest pain or shortness of breath.

## 2024-01-23 ENCOUNTER — OFFICE VISIT (OUTPATIENT)
Dept: INTERNAL MEDICINE CLINIC | Age: 34
End: 2024-01-23

## 2024-01-23 VITALS
BODY MASS INDEX: 46.25 KG/M2 | SYSTOLIC BLOOD PRESSURE: 153 MMHG | DIASTOLIC BLOOD PRESSURE: 112 MMHG | OXYGEN SATURATION: 99 % | TEMPERATURE: 97.9 F | WEIGHT: 315 LBS | HEART RATE: 88 BPM | RESPIRATION RATE: 18 BRPM

## 2024-01-23 DIAGNOSIS — I10 PRIMARY HYPERTENSION: Primary | ICD-10-CM

## 2024-01-23 DIAGNOSIS — Z00.00 HEALTHCARE MAINTENANCE: ICD-10-CM

## 2024-01-23 LAB — HBA1C MFR BLD: 5.8 %

## 2024-01-23 PROCEDURE — 83036 HEMOGLOBIN GLYCOSYLATED A1C: CPT

## 2024-01-23 PROCEDURE — 99213 OFFICE O/P EST LOW 20 MIN: CPT

## 2024-01-23 RX ORDER — AMLODIPINE BESYLATE 5 MG/1
5 TABLET ORAL DAILY
Qty: 90 TABLET | Refills: 0 | Status: SHIPPED | OUTPATIENT
Start: 2024-01-23

## 2024-01-23 RX ORDER — LISINOPRIL 10 MG/1
20 TABLET ORAL DAILY
Qty: 90 TABLET | Refills: 1 | Status: SHIPPED | OUTPATIENT
Start: 2024-01-23

## 2024-01-23 ASSESSMENT — PATIENT HEALTH QUESTIONNAIRE - PHQ9
SUM OF ALL RESPONSES TO PHQ9 QUESTIONS 1 & 2: 0
SUM OF ALL RESPONSES TO PHQ QUESTIONS 1-9: 0
2. FEELING DOWN, DEPRESSED OR HOPELESS: 0
SUM OF ALL RESPONSES TO PHQ QUESTIONS 1-9: 0
1. LITTLE INTEREST OR PLEASURE IN DOING THINGS: 0

## 2024-01-23 ASSESSMENT — ENCOUNTER SYMPTOMS
SHORTNESS OF BREATH: 0
ABDOMINAL PAIN: 0
VOMITING: 0
NAUSEA: 0
DIARRHEA: 0
COUGH: 0

## 2024-01-23 NOTE — PROGRESS NOTES
advised pt to check BP daily at home; provided BP checking sheet which pt is to bring to his next appt   - pt very motivated to lose weight and eat healthier   Orders:  -     lisinopril (PRINIVIL;ZESTRIL) 10 MG tablet; Take 2 tablets by mouth daily, Disp-90 tablet, R-1Normal  -     amLODIPine (NORVASC) 5 MG tablet; Take 1 tablet by mouth daily, Disp-90 tablet, R-0Normal  2. Healthcare maintenance  -     Urinalysis with Microscopic  -     Basic Metabolic Panel; Future  -     Lipid, Fasting; Future  -     HIV Screen; Future  -     POCT glycosylated hemoglobin (Hb A1C)    Plan:   Fasted Blood work  Start lisinopril 20mg  F/u 5 weeks w/ BP check    Healthcare maintenance  - flu vaccine: done 2023    - TdaP vaccine: done in the last <10 years (pt unsure when)    - COVID-19 vaccine: done 2023    - A1c: POCT done on 1/23/24 was 5.8; will monitor for now     - hepatitis C: no hx of high risk activity   - HIV screen: will obtain        Return in about 5 weeks (around 2/27/2024).    The patient was staffed with teaching attending: Dr. Arturo Whittington.    An electronic signature was used to authenticate this note.    --Madison William MD

## 2024-01-23 NOTE — PATIENT INSTRUCTIONS
1. Please START taking lisinopril 20mg once a day. CONTINUE taking your amlodipine 5mg once a day.     2. Check your blood pressure once a day and write the number down on the sheets provided. Please bring your blood pressure logs and your blood pressure machine and cuff to your next appointment.     3. Please get blood work before your next appointment. They must be obtained FASTED, meaning do not eat after midnight the day prior to getting your blood work.   Bring your blood pressure cuff from home to next appointment along with your log.   Mirvaso Counseling: Mirvaso is a topical medication which can decrease superficial blood flow where applied. Side effects are uncommon and include stinging, redness and allergic reactions.

## 2024-01-23 NOTE — ASSESSMENT & PLAN NOTE
- /108 here, repeat still 150s/100s   - will begin lisinopril 20mg  - advised pt of importance of healthy eating; discussed necessary dietary changes and calorie counting   - advised pt to check BP daily at home; provided BP checking sheet which pt is to bring to his next appt   - pt very motivated to lose weight and eat healthier

## 2024-02-22 PROBLEM — Z00.00 HEALTHCARE MAINTENANCE: Status: RESOLVED | Noted: 2024-01-23 | Resolved: 2024-02-22

## 2024-08-06 DIAGNOSIS — I10 PRIMARY HYPERTENSION: ICD-10-CM

## 2024-08-06 RX ORDER — LISINOPRIL 10 MG/1
20 TABLET ORAL DAILY
Qty: 90 TABLET | Refills: 1 | Status: SHIPPED | OUTPATIENT
Start: 2024-08-06

## 2024-08-06 RX ORDER — AMLODIPINE BESYLATE 5 MG/1
5 TABLET ORAL DAILY
Qty: 90 TABLET | Refills: 0 | Status: SHIPPED | OUTPATIENT
Start: 2024-08-06

## 2024-08-06 NOTE — TELEPHONE ENCOUNTER
Requested Prescriptions     Pending Prescriptions Disp Refills    amLODIPine (NORVASC) 5 MG tablet [Pharmacy Med Name: AMLODIPINE BESYLATE 5 MG TAB] 90 tablet 0     Sig: TAKE 1 TABLET BY MOUTH DAILY    lisinopril (PRINIVIL;ZESTRIL) 10 MG tablet [Pharmacy Med Name: LISINOPRIL 10 MG TABLET] 90 tablet 1     Sig: TAKE 2 TABLETS BY MOUTH DAILY       Last Clinic Visit:  1/23/2024     Next Clinic Appointment:  Visit date not found

## 2025-03-19 ENCOUNTER — TELEPHONE (OUTPATIENT)
Dept: INTERNAL MEDICINE CLINIC | Age: 35
End: 2025-03-19

## 2025-07-18 ENCOUNTER — TELEPHONE (OUTPATIENT)
Dept: INTERNAL MEDICINE CLINIC | Age: 35
End: 2025-07-18